# Patient Record
Sex: FEMALE | Race: WHITE | NOT HISPANIC OR LATINO | Employment: OTHER | URBAN - METROPOLITAN AREA
[De-identification: names, ages, dates, MRNs, and addresses within clinical notes are randomized per-mention and may not be internally consistent; named-entity substitution may affect disease eponyms.]

---

## 2019-03-07 ENCOUNTER — HOSPITAL ENCOUNTER (OUTPATIENT)
Dept: GENERAL RADIOLOGY | Facility: HOSPITAL | Age: 74
Discharge: HOME | End: 2019-03-07

## 2020-06-02 ENCOUNTER — HOSPITAL ENCOUNTER (OUTPATIENT)
Dept: GENERAL RADIOLOGY | Facility: HOSPITAL | Age: 75
Discharge: HOME | End: 2020-06-02
Attending: NURSE PRACTITIONER

## 2020-06-08 ENCOUNTER — HOSPITAL ENCOUNTER (OUTPATIENT)
Dept: ULTRASOUND IMAGING | Facility: HOSPITAL | Age: 75
Discharge: HOME | End: 2020-06-08
Attending: NURSE PRACTITIONER

## 2020-07-16 ENCOUNTER — HOSPITAL ENCOUNTER (OUTPATIENT)
Dept: GENERAL RADIOLOGY | Facility: HOSPITAL | Age: 75
Discharge: HOME | End: 2020-07-16
Attending: NURSE PRACTITIONER

## 2020-12-02 ENCOUNTER — HOSPITAL ENCOUNTER (OUTPATIENT)
Dept: GENERAL RADIOLOGY | Facility: HOSPITAL | Age: 75
Discharge: HOME | End: 2020-12-02
Attending: NURSE PRACTITIONER

## 2021-01-27 ENCOUNTER — OFFICE VISIT CONVERTED (OUTPATIENT)
Dept: OTOLARYNGOLOGY | Facility: CLINIC | Age: 76
End: 2021-01-27
Attending: OTOLARYNGOLOGY

## 2021-01-27 ENCOUNTER — CONVERSION ENCOUNTER (OUTPATIENT)
Dept: OTOLARYNGOLOGY | Facility: CLINIC | Age: 76
End: 2021-01-27

## 2021-02-01 ENCOUNTER — HOSPITAL ENCOUNTER (OUTPATIENT)
Dept: OTHER | Facility: HOSPITAL | Age: 76
Discharge: HOME | End: 2021-02-01
Attending: INTERNAL MEDICINE

## 2021-03-24 ENCOUNTER — HOSPITAL ENCOUNTER (OUTPATIENT)
Dept: PREADMISSION TESTING | Facility: HOSPITAL | Age: 76
Discharge: HOME | End: 2021-03-24
Attending: ORTHOPAEDIC SURGERY

## 2021-03-24 ENCOUNTER — CONVERSION ENCOUNTER (OUTPATIENT)
Dept: ORTHOPEDIC SURGERY | Facility: CLINIC | Age: 76
End: 2021-03-24

## 2021-03-24 ENCOUNTER — OFFICE VISIT CONVERTED (OUTPATIENT)
Dept: ORTHOPEDIC SURGERY | Facility: CLINIC | Age: 76
End: 2021-03-24
Attending: ORTHOPAEDIC SURGERY

## 2021-03-24 LAB — SARS-COV-2 RNA SPEC QL NAA+PROBE: NOT DETECTED

## 2021-03-25 ENCOUNTER — HOSPITAL ENCOUNTER (OUTPATIENT)
Dept: PERIOP | Facility: HOSPITAL | Age: 76
Setting detail: HOSPITAL OUTPATIENT SURGERY
Discharge: HOME | End: 2021-03-25
Attending: ORTHOPAEDIC SURGERY

## 2021-03-25 LAB
ANION GAP SERPL CALC-SCNC: 15 MMOL/L (ref 8–19)
BUN SERPL-MCNC: 38 MG/DL (ref 5–25)
BUN/CREAT SERPL: 30 {RATIO} (ref 6–20)
CALCIUM SERPL-MCNC: 9.3 MG/DL (ref 8.7–10.4)
CHLORIDE SERPL-SCNC: 102 MMOL/L (ref 99–111)
CONV CO2: 28 MMOL/L (ref 22–32)
CREAT UR-MCNC: 1.26 MG/DL (ref 0.5–0.9)
GFR SERPLBLD BASED ON 1.73 SQ M-ARVRAT: 41 ML/MIN/{1.73_M2}
GLUCOSE BLD-MCNC: 108 MG/DL (ref 65–99)
GLUCOSE SERPL-MCNC: 99 MG/DL (ref 65–99)
OSMOLALITY SERPL CALC.SUM OF ELEC: 301 MOSM/KG (ref 273–304)
POTASSIUM SERPL-SCNC: 3.6 MMOL/L (ref 3.5–5.3)
SODIUM SERPL-SCNC: 141 MMOL/L (ref 135–147)

## 2021-04-07 ENCOUNTER — CONVERSION ENCOUNTER (OUTPATIENT)
Dept: ORTHOPEDIC SURGERY | Facility: CLINIC | Age: 76
End: 2021-04-07

## 2021-04-07 ENCOUNTER — OFFICE VISIT CONVERTED (OUTPATIENT)
Dept: ORTHOPEDIC SURGERY | Facility: CLINIC | Age: 76
End: 2021-04-07

## 2021-05-10 ENCOUNTER — OFFICE VISIT CONVERTED (OUTPATIENT)
Dept: ORTHOPEDIC SURGERY | Facility: CLINIC | Age: 76
End: 2021-05-10
Attending: PHYSICIAN ASSISTANT

## 2021-05-10 NOTE — H&P
History and Physical      Patient Name: Gemma Benjamin   Patient ID: [id number]   Sex: Female   YOB: 1945    Primary Care Provider: Alice LINDER   Referring Provider: Alice LINDER    Visit Date: March 24, 2021    Provider: Don Jolly MD   Location: [facility]   Location Address: [address] [place] [address]  [id number]   Location Phone: [phone]          Chief Complaint  · Left Wrist Injury      History Of Present Illness  Gemma Benjamin is a 75 year old /White female who presents today to [facility].      Patient presents today for an evaluation of a left wrist injury. Patient had injured her left wrist on 3/21/21. She went to [facility] ED and obtained x-rays revealed a left wrist fracture. She had a closed reduction performed at the ED and was placed into a long arm splint. She was referred to [facility] for further evaluation. She is present today in the long arm splint. She states she does have a significant amount of pain today.       Past Medical History  GERD (gastroesophageal reflux disease); Hyperlipidemia; Hypertension; Sleep apnea; Thyroid nodules (nontoxic multinodular goiter); Type 2 diabetes mellitus         Past Surgical History  Lumpectomy of left breast         Medication List  escitalopram oxalate 20 mg oral tablet; indapamide 2.5 mg oral tablet; losartan 100 mg oral tablet; Novolog Flexpen U-100 Insulin 100 unit/mL (3 mL) subcutaneous insulin pen; rosuvastatin 10 mg oral tablet; Vitamin D3 50 mcg (2,000 unit) oral tablet         Allergy List  SULFA (SULFONAMIDES)       Allergies Reconciled  Family Medical History  Family history of lung cancer; Family history of pancreatic cancer; Family history of stroke; Family history of heart disease; Family history of diabetes mellitus         Social History  Tobacco (Never)         Review of Systems  · Constitutional  o Denies  o : fever, chills, weight loss  · Cardiovascular  o Denies  o :  "chest pain, shortness of breath  · Gastrointestinal  o Denies  o : liver disease, heartburn, nausea, blood in stools  · Genitourinary  o Denies  o : painful urination, blood in urine  · Integument  o Denies  o : rash, itching  · Neurologic  o Denies  o : headache, weakness, loss of consciousness  · Musculoskeletal  o Denies  o : painful, swollen joints  · Psychiatric  o Denies  o : drug/alcohol addiction, anxiety, depression      Vitals  Date Time BP Position Site L\R Cuff Size HR RR TEMP (F) WT  HT  BMI kg/m2 BSA m2 O2 Sat FR L/min FiO2 HC       03/24/2021 08:03 AM      78 - R   221lbs 8oz 5'  2\" 40.51 2.1 96 %            Physical Examination  · Constitutional  o Appearance  o : well developed, well-nourished, no obvious deformities present  · Head and Face  o Head  o :   § Inspection  § : normocephalic  o Face  o :   § Inspection  § : no facial lesions  · Eyes  o Conjunctivae  o : conjunctivae normal  o Sclerae  o : sclerae white  · Ears, Nose, Mouth and Throat  o Ears  o :   § External Ears  § : appearance within normal limits  § Hearing  § : intact  o Nose  o :   § External Nose  § : appearance normal  · Neck  o Inspection/Palpation  o : normal appearance  o Range of Motion  o : full range of motion  · Respiratory  o Respiratory Effort  o : breathing unlabored  o Inspection of Chest  o : normal appearance  o Auscultation of Lungs  o : no audible wheezing or rales  · Cardiovascular  o Heart  o : regular rate  · Gastrointestinal  o Abdominal Examination  o : soft and non-tender  · Skin and Subcutaneous Tissue  o General Inspection  o : intact, no rashes  · Psychiatric  o General  o : Alert and oriented x3  o Judgement and Insight  o : judgment and insight intact  o Mood and Affect  o : mood normal, affect appropriate  · Left Wrist  o Inspection  o : Sensation grossly intact. Neurovascular intact. Skin intact. Patient able to wiggle fingers and make a fist. Swelling. Radial pulse 2+, ulnar pulse 2+. Angulation of " the wrist.   · Imaging  o Imaging  o : 3/21/21 X-RAY LEFT WRIST: There has been interval closed reduction of the distal left radial fracture with slight improvement of alignment.           Assessment  · Left distal radius fracture     814.01  · Left wrist pain     719.43/M25.532      Plan  · Medications  o Medications have been Reconciled  o Transition of Care or Provider Policy  · Instructions  o Dr. Jolly saw and examined the patient and agrees with plan.   o X-rays reviewed by Dr. Jolly.  o Reviewed the patient's Past Medical, Social, and Family history as well as the ROS at today's visit, no changes.  o Call or return if worsening symptoms.  o Discussed surgery.  o Risks/benefits discussed with patient including, but not limited to: infection, bleeding, neurovascular damage, malunion, nonunion, aesthetic deformity, need for further surgery, and death.  o Surgery pamphlet given.  o Follow Up Post-Op.  o The above service was scribed by Zenaida Martin on my behalf and I attest to the accuracy of the note. taryn  o Discussed treatment plans and diagnosis with the patient. Discussed operative vs non-operative measures. Patient wishes to proceed with reduction and fixation of left distal radius fracture.   o Electronically Identified Patient Education Materials Provided Electronically            Electronically Signed by: Zenaida Martin-, Other -Author on March 24, 2021 08:59:35 AM  Electronically Co-signed by: Don Jolly MD -Reviewer on March 24, 2021 09:21:50 PM

## 2021-05-10 NOTE — H&P
"   History and Physical      Patient Name: Gemma Benjamin   Patient ID: [id number]   Sex: Female   YOB: 1945    Primary Care Provider: Alice LINDER   Referring Provider: Alice LINDER    Visit Date: January 27, 2021    Provider: Wali Fowler MD   Location: [facility]   Location Address: [address] [place] [address], [address] [place] [address]  [id number]   Location Phone: [phone]          Chief Complaint     \"I am here about my thyroid.\"       History Of Present Illness  Gemma Benjamin is a 75 year old /White female with past medical history significant for hypertension who presents to the office today as a consult from Alice BRUNO for evaluation of thyroid nodules. She tells me that these were initially discovered on work-up of shortness of breath with exertion. She has seen Dr. Seay who is an endocrine surgeon in [place]. She denies any issues with dysphagia, hoarseness, or stridor. She cannot recall any family history of thyroid cancer but does report a personal history of radiation exposure while undergoing radiation therapy for breast cancer. She does not smoke. Her initial thyroid ultrasound on 6/2/2020 revealed a right 1.6 cm hypoechoic nodule, a right 2.6 cm isoechoic nodule, a right 0.9 cm hypoechoic nodule, and 3 left-sided nodules with the largest measuring 1.9 cm. Subsequent ultrasound-guided FNA of the 2 largest right-sided nodules on 6/8/2020 was found to be consistent with colloid nodules ([place] category 2). Her most recent thyroid ultrasound on 12/2/2020 revealed an increase in the hypoechoic right 1.6 cm nodule to 2.1 cm.       Past Medical History  GERD (gastroesophageal reflux disease); Hyperlipidemia; Hypertension; Sleep apnea; Thyroid nodules (nontoxic multinodular goiter); Type 2 diabetes mellitus         Past Surgical History  Lumpectomy of left breast         Medication List  escitalopram oxalate 20 mg oral tablet; indapamide 2.5 mg " "oral tablet; losartan 100 mg oral tablet; Novolog Flexpen U-100 Insulin 100 unit/mL (3 mL) subcutaneous insulin pen; rosuvastatin 10 mg oral tablet; Vitamin D3 50 mcg (2,000 unit) oral tablet         Allergy List  SULFA (SULFONAMIDES)         Family Medical History  Family history of lung cancer; Family history of pancreatic cancer; Family history of stroke; Family history of heart disease; Family history of diabetes mellitus         Social History  Tobacco (Never)         Review of Systems  · Constitutional  o Denies  o : fever, night sweats, weight loss  · Eyes  o Denies  o : discharge from eye, impaired vision  · HENT  o Admits  o : *See HPI  · Cardiovascular  o Denies  o : chest pain, irregular heart beats  · Respiratory  o Denies  o : shortness of breath, wheezing, coughing up blood  · Gastrointestinal  o Admits  o : reflux  o Denies  o : heartburn, vomiting blood  · Genitourinary  o Denies  o : frequency  · Integument  o Denies  o : rash, skin dryness  · Neurologic  o Denies  o : seizures, loss of balance, loss of consciousness, dizziness  · Endocrine  o Denies  o : cold intolerance, heat intolerance  · Heme-Lymph  o Denies  o : easy bleeding, anemia      Vitals  Date Time BP Position Site L\R Cuff Size HR RR TEMP (F) WT  HT  BMI kg/m2 BSA m2 O2 Sat FR L/min FiO2 HC       01/27/2021 11:06 AM        96 220lbs 0oz 5'  2\" 40.24 2.09             Physical Examination  · Constitutional  o Appearance  o : well developed, well-nourished, alert and in no acute distress, voice clear and strong  · Head and Face  o Head  o :   § Inspection  § : no deformities or lesions  o Face  o :   § Inspection  § : No facial lesions; House-Brackmann I/VI bilaterally  § Palpation  § : No TMJ crepitus nor  muscle tenderness bilaterally  · Eyes  o Vision  o :   § Visual Fields  § : Extraocular movements are intact. No spontaneous or gaze-induced nystagmus.  o Conjunctivae  o : clear  o Sclerae  o : clear  o Pupils and " Irises  o : pupils equal, round, and reactive to light.   · Ears, Nose, Mouth and Throat  o Ears  o :   § External Ears  § : appearance within normal limits, no lesions present  § Otoscopic Examination  § : tympanic membrane appearance within normal limits bilaterally without perforations, well-aerated middle ears  § Hearing  § : intact to conversational voice both ears  o Nose  o :   § External Nose  § : appearance normal  § Intranasal Exam  § : mucosa within normal limits, vestibules normal, no intranasal lesions present, septum midline, sinuses non tender to percussion  o Oral Cavity  o :   § Oral Mucosa  § : oral mucosa normal without pallor or cyanosis  § Lips  § : lip appearance normal  § Teeth  § : Partial dentition for age  § Gums  § : gums pink, non-swollen, no bleeding present  § Tongue  § : tongue appearance normal; normal mobility  § Palate  § : hard palate normal, soft palate appearance normal with symmetric mobility  o Throat  o :   § Oropharynx  § : no inflammation or lesions present, tonsils within normal limits  § Hypopharynx  § : Deferred secondary to gag reflex  § Larynx  § : Deferred secondary to gag reflex  · Neck  o Inspection/Palpation  o : normal appearance, no masses or tenderness, trachea midline; thyroid size normal, nontender, no obviously palpable nodules  · Respiratory  o Respiratory Effort  o : breathing unlabored  o Inspection of Chest  o : normal appearance, no retractions  · Cardiovascular  o Heart  o : regular rate and rhythm  · Lymphatic  o Neck  o : no lymphadenopathy present  o Supraclavicular Nodes  o : no lymphadenopathy present  o Preauricular Nodes  o : no lymphadenopathy present  · Skin and Subcutaneous Tissue  o General Inspection  o : Regarding face and neck - there are no rashes present, no lesions present, and no areas of discoloration  · Neurologic  o Cranial Nerves  o : cranial nerves II-XII are grossly intact bilaterally  o Gait and Station  o : normal gait, able to  stand without diffculty  · Psychiatric  o Judgement and Insight  o : judgment and insight intact  o Mood and Affect  o : mood normal, affect appropriate          Assessment  · Thyroid nodule     241.0/E04.1      Plan  · Orders  o Ultrasound Thyroid. (71657) - 241.0/E04.1 - 12/28/2021  · Medications  o Medications have been Reconciled  o Transition of Care or Provider Policy  · Instructions  o Impressions and findings were discussed Mrs. Benjamin at great length. Currently, she is seen for evaluation of thyroid nodules. Fortunately, she does not have any significant risk factors for thyroid cancer and a previous ultrasound-guided FNA of her 2 largest right-sided nodules was found to be consistent with colloid nodules ([place] category 2). We discussed the pathophysiology and natural history of these benign nodules. One of the previously biopsied nodules was found to have increased from 1.6 cm on 6/2/2020 to 2.1 cm on 12/2/2020. Even with this change in size I would recommend continued observation given her previous FNA results and lack of symptoms. If this increases significantly we will discuss repeat sampling versus thyroid lobectomy. In the meantime, we will proceed with a repeat ultrasound in 1 year or sooner if needed.  · Correspondence  o ENT Letter to Referring MD (Alice LINDER) - 01/28/2021            Electronically Signed by: Wali Fowler MD -Author on January 28, 2021 08:46:37 AM

## 2021-05-14 VITALS — HEART RATE: 76 BPM | WEIGHT: 220 LBS | HEIGHT: 62 IN | BODY MASS INDEX: 40.48 KG/M2 | OXYGEN SATURATION: 98 %

## 2021-05-14 VITALS — BODY MASS INDEX: 40.76 KG/M2 | WEIGHT: 221.5 LBS | HEIGHT: 62 IN | OXYGEN SATURATION: 96 % | HEART RATE: 78 BPM

## 2021-05-14 VITALS — TEMPERATURE: 96 F | BODY MASS INDEX: 40.48 KG/M2 | WEIGHT: 220 LBS | HEIGHT: 62 IN

## 2021-05-14 NOTE — PROGRESS NOTES
Progress Note      Patient Name: Gemma Benjamin   Patient ID: [id number]   Sex: Female   YOB: 1945    Primary Care Provider: Alice LINDER   Referring Provider: Alice LINDER    Visit Date: April 7, 2021    Provider: Baltazar Moses PA-C   Location: [facility]   Location Address: [address] [place] [address]  [id number]   Location Phone: [phone]          Chief Complaint  · Left Distal Radius Fracture      History Of Present Illness  Gemma Benjamin is a 75 year old /White female who presents today to [facility].      Patient presents today with a follow-up of left wrist pain. She is s/p open reduction and fixation of a 3-part intraarticular distal radius fracture performed on 3/25/21 by Dr. Jolly. She sustained distal radius fracture on 3/21/21. Patient reports she has been adherent to all the interventions put in during the time of surgery.       Past Medical History  Arthritis; Asthma; Cancer; Diabetes; GERD (gastroesophageal reflux disease); Hyperlipemia; Hyperlipidemia; Hypertension; Reflux; Seasonal allergies; Sleep apnea; Thyroid disorder; Thyroid nodules (nontoxic multinodular goiter); Type 2 diabetes mellitus         Past Surgical History  Lumpectomy of left breast         Medication List  escitalopram oxalate 20 mg oral tablet; indapamide 2.5 mg oral tablet; losartan 100 mg oral tablet; Novolog Flexpen U-100 Insulin 100 unit/mL (3 mL) subcutaneous insulin pen; rosuvastatin 10 mg oral tablet; Vitamin D3 50 mcg (2,000 unit) oral tablet         Allergy List  SULFA (SULFONAMIDES)       Allergies Reconciled  Family Medical History  Stroke; Heart Disease; Cancer, Unspecified; Diabetes, unspecified type; Family history of lung cancer; Osteoporosis; Family history of pancreatic cancer; Family history of Arthritis; Family history of stroke; Family history of heart disease; Family history of diabetes mellitus         Social History  Alcohol Use  "(Current some day); lives with children; lives with spouse; .; Recreational Drug Use (Never); Retired.; Tobacco (Never)         Review of Systems  · Constitutional  o Denies  o : fever, chills, weight loss  · Cardiovascular  o Denies  o : chest pain, shortness of breath  · Gastrointestinal  o Denies  o : liver disease, heartburn, nausea, blood in stools  · Genitourinary  o Denies  o : painful urination, blood in urine  · Integument  o Denies  o : rash, itching  · Neurologic  o Denies  o : headache, weakness, loss of consciousness  · Musculoskeletal  o Denies  o : painful, swollen joints  · Psychiatric  o Denies  o : drug/alcohol addiction, anxiety, depression      Vitals  Date Time BP Position Site L\R Cuff Size HR RR TEMP (F) WT  HT  BMI kg/m2 BSA m2 O2 Sat FR L/min FiO2 HC       04/07/2021 01:08 PM      75 - R   220lbs 0oz 5'  2\" 40.24 2.09 98 %            Physical Examination  · Constitutional  o Appearance  o : well developed, well-nourished, no obvious deformities present  · Head and Face  o Head  o :   § Inspection  § : normocephalic  o Face  o :   § Inspection  § : no facial lesions  · Eyes  o Conjunctivae  o : conjunctivae normal  o Sclerae  o : sclerae white  · Ears, Nose, Mouth and Throat  o Ears  o :   § External Ears  § : appearance within normal limits  § Hearing  § : intact  o Nose  o :   § External Nose  § : appearance normal  · Neck  o Inspection/Palpation  o : normal appearance  o Range of Motion  o : full range of motion  · Respiratory  o Respiratory Effort  o : breathing unlabored  o Inspection of Chest  o : normal appearance  o Auscultation of Lungs  o : no audible wheezing or rales  · Cardiovascular  o Heart  o : regular rate  · Gastrointestinal  o Abdominal Examination  o : soft and non-tender  · Skin and Subcutaneous Tissue  o General Inspection  o : intact, no rashes  · Psychiatric  o General  o : Alert and oriented x3  o Judgement and Insight  o : judgment and insight intact  o Mood " and Affect  o : mood normal, affect appropriate  · Left Wrist  o Inspection  o : Pain limited as expected. Surgical sites appreciated and are clean, dry intact and healing appreciated. Neruovascularly intact.   · In Office Procedures  o View  o : AP/LATERAL  o Site  o : left, wrist   o Indication  o : Left wrist fracture  o Study  o : X-rays ordered, taken in the office, and reviewed today.  o Xray  o : Good anatomic alignment of the fracture.           Assessment  · Aftercare following left open reduction and fixation of a 3-part intraarticular distal radius fracture     V54.81  · Left wrist pain     719.43/M25.532      Plan  · Orders  o Wrist (Left) 2 views X-Ray [facility] ([id number]) - 719.43/M25.532 - 04/07/2021  · Medications  o Medications have been Reconciled  o Transition of Care or Provider Policy  · Instructions  o Reviewed the patient's Past Medical, Social, and Family history as well as the ROS at today's visit, no changes.  o Call or return if worsening symptoms.  o Follow Up in 4 weeks.   o This note was transcribed by Zenaida Martin. gail/taryn  o Patient is placed into a comfort form brace today and is directed to wear this brace at all times except for doing gentle range of motion as demonstrated in clinic today and personal hygiene. Patient is limited to no more than 8 ounces of weight holding and lifting with the left hand. Patient is to follow-up in 4 weeks and x-rays will be obtained at that time. 2 views of the left wrist.             Electronically Signed by: Zenaida Martin-, Other -Author on April 9, 2021 11:37:31 AM  Electronically Co-signed by: Don Jolly MD -Reviewer on April 9, 2021 09:24:12 PM  Electronically Co-signed by: Baltazar Moses PA-C -Reviewer on April 12, 2021 12:56:47 PM

## 2021-05-22 ENCOUNTER — TRANSCRIBE ORDERS (OUTPATIENT)
Dept: ADMINISTRATIVE | Facility: HOSPITAL | Age: 76
End: 2021-05-22

## 2021-05-22 DIAGNOSIS — E04.2 MULTIPLE THYROID NODULES: Primary | ICD-10-CM

## 2021-06-06 NOTE — PROGRESS NOTES
Progress Note      Patient Name: Gemma Benjamin   Patient ID: [id number]   Sex: Female   YOB: 1945    Primary Care Provider: Alice LINDER   Referring Provider: Alice LINDER    Visit Date: May 10, 2021    Provider: Baltazar Moses PA-C   Location: [facility]   Location Address: [address] [place] [address]  [id number]   Location Phone: [phone]          Chief Complaint  · left wrist pain      History Of Present Illness  Gemma Benjamin is a 75 year old /White female who presents today to [facility].      Patient presents today for a follow-up of left wrist. She is s/p open reduction and fixation of a 3-part intraarticular distal radius fracture performed on 3/25/21 by Dr. Jolly. Patient sustained distal radius fracture on 3/21/21. She has been adherent since all interventions placed since the last visit with us.            Past Medical History  Arthritis; Asthma; Cancer; Diabetes; GERD (gastroesophageal reflux disease); Hyperlipemia; Hyperlipidemia; Hypertension; Reflux; Seasonal allergies; Sleep apnea; Thyroid disorder; Thyroid nodules (nontoxic multinodular goiter); Type 2 diabetes mellitus         Past Surgical History  Lumpectomy of left breast         Medication List  escitalopram oxalate 20 mg oral tablet; indapamide 2.5 mg oral tablet; losartan 100 mg oral tablet; Novolog Flexpen U-100 Insulin 100 unit/mL (3 mL) subcutaneous insulin pen; rosuvastatin 10 mg oral tablet; Vitamin D3 50 mcg (2,000 unit) oral tablet         Allergy List  SULFA (SULFONAMIDES)       Allergies Reconciled  Family Medical History  Stroke; Heart Disease; Cancer, Unspecified; Diabetes, unspecified type; Family history of lung cancer; Osteoporosis; Family history of pancreatic cancer; Family history of Arthritis; Family history of stroke; Family history of heart disease; Family history of diabetes mellitus         Social History  Alcohol Use (Current some day); lives with  "children; lives with spouse; .; Recreational Drug Use (Never); Retired.; Tobacco (Never)         Review of Systems  · Constitutional  o Denies  o : fever, chills, weight loss  · Cardiovascular  o Denies  o : chest pain, shortness of breath  · Gastrointestinal  o Denies  o : liver disease, heartburn, nausea, blood in stools  · Genitourinary  o Denies  o : painful urination, blood in urine  · Integument  o Denies  o : rash, itching  · Neurologic  o Denies  o : headache, weakness, loss of consciousness  · Musculoskeletal  o Denies  o : painful, swollen joints  · Psychiatric  o Denies  o : drug/alcohol addiction, anxiety, depression      Vitals  Date Time BP Position Site L\R Cuff Size HR RR TEMP (F) WT  HT  BMI kg/m2 BSA m2 O2 Sat FR L/min FiO2 HC       05/10/2021 02:08 PM      84 - R   219lbs 2oz 5'  3\" 38.82 2.1 91 %            Physical Examination  · Constitutional  o Appearance  o : well developed, well-nourished, no obvious deformities present  · Head and Face  o Head  o :   § Inspection  § : normocephalic  o Face  o :   § Inspection  § : no facial lesions  · Eyes  o Conjunctivae  o : conjunctivae normal  o Sclerae  o : sclerae white  · Ears, Nose, Mouth and Throat  o Ears  o :   § External Ears  § : appearance within normal limits  § Hearing  § : intact  o Nose  o :   § External Nose  § : appearance normal  · Neck  o Inspection/Palpation  o : normal appearance  o Range of Motion  o : full range of motion  · Respiratory  o Respiratory Effort  o : breathing unlabored  o Inspection of Chest  o : normal appearance  o Auscultation of Lungs  o : no audible wheezing or rales  · Cardiovascular  o Heart  o : regular rate  · Gastrointestinal  o Abdominal Examination  o : soft and non-tender  · Skin and Subcutaneous Tissue  o General Inspection  o : intact, no rashes  · Psychiatric  o General  o : Alert and oriented x3  o Judgement and Insight  o : judgment and insight intact  o Mood and Affect  o : mood normal, " affect appropriate  · Left Wrist  o Inspection  o : Surgical site appreciated to be healing well with good scar formation and supple. 5 degrees of palmar flexion. Neurovascular intact.   · In Office Procedures  o View  o : AP/LATERAL  o Site  o : left, wrist  o Indication  o : left wrist pain  o Study  o : X-rays ordered, taken in the office, and reviewed today.  o Xray  o : 3-part left distal radius fracture with no appreciable changes of alignment or positions compared to previous x-rays with routine healing. Retained hardware of ORIF demonstrates no signs of loosening.  o Comparative Data  o : Comparative Data found and reviewed today              Assessment  · Aftercare following open reduction and fixation of a 3-part intraarticular distal radius fracture     V54.81  · Pain: Wrist     719.43/M25.539      Plan  · Orders  o Wrist (Left) 2 views X-Ray [facility] ([id number]) - 719.43/M25.539 - 05/10/2021  · Medications  o Medications have been Reconciled  o Transition of Care or Provider Policy  · Instructions  o Reviewed the patient's Past Medical, Social, and Family history as well as the ROS at today's visit, no changes.  o Call or return if worsening symptoms.  o X-ray ordered, taken and reviewed at this visit.  o Reviewed Xrays.  o Follow Up in 4 weeks.   o This note was transcribed by Zenaida Martin. gail/taryn  o Patient is referred to physical therapy/hand therapy at this time. She is to follow-up in 4 weeks. She is limited to no more than 2lbs of weight lifting of the left hand. She will get repeat X-rays at the next visit, 2 views of left wrist. She will continue the use of the left wrist brace. She states her understanding and agreement with this plan.             Electronically Signed by: Zenaida Martin-, Other -Author on May 11, 2021 08:56:16 AM  Electronically Co-signed by: Baltazar Moses PA-C -Reviewer on May 11, 2021 12:05:47 PM  Electronically Co-signed by: Don Jolly MD -Reviewer on  May 14, 2021 09:29:02 PM

## 2021-06-21 ENCOUNTER — OFFICE VISIT (OUTPATIENT)
Dept: ORTHOPEDIC SURGERY | Facility: CLINIC | Age: 76
End: 2021-06-21

## 2021-06-21 VITALS — OXYGEN SATURATION: 94 % | HEIGHT: 62 IN | BODY MASS INDEX: 36.8 KG/M2 | HEART RATE: 73 BPM | WEIGHT: 200 LBS

## 2021-06-21 DIAGNOSIS — Z47.89 AFTERCARE FOLLOWING SURGERY OF THE MUSCULOSKELETAL SYSTEM: ICD-10-CM

## 2021-06-21 DIAGNOSIS — M25.532 LEFT WRIST PAIN: Primary | ICD-10-CM

## 2021-06-21 PROCEDURE — 99024 POSTOP FOLLOW-UP VISIT: CPT | Performed by: PHYSICIAN ASSISTANT

## 2021-06-21 RX ORDER — BUSPIRONE HYDROCHLORIDE 10 MG/1
10 TABLET ORAL
COMMUNITY
Start: 2021-05-20 | End: 2022-12-21 | Stop reason: SDUPTHER

## 2021-06-21 RX ORDER — INSULIN PUMP CONTROLLER
EACH MISCELLANEOUS
COMMUNITY
Start: 2021-05-19 | End: 2022-12-21

## 2021-06-21 RX ORDER — INSULIN PUMP CONTROLLER
EACH MISCELLANEOUS
COMMUNITY
Start: 2021-01-21 | End: 2021-12-09 | Stop reason: SDUPTHER

## 2021-06-21 RX ORDER — INDAPAMIDE 2.5 MG/1
TABLET, FILM COATED ORAL
COMMUNITY
Start: 2021-05-21

## 2021-06-21 RX ORDER — ESCITALOPRAM OXALATE 20 MG/1
TABLET ORAL
COMMUNITY
Start: 2021-05-22 | End: 2022-12-21

## 2021-06-21 RX ORDER — PEN NEEDLE, DIABETIC 32 GX 1/4"
NEEDLE, DISPOSABLE MISCELLANEOUS
COMMUNITY
Start: 2021-04-22

## 2021-06-21 RX ORDER — ROSUVASTATIN CALCIUM 20 MG/1
20 TABLET, COATED ORAL DAILY
COMMUNITY
Start: 2021-05-26 | End: 2021-12-09 | Stop reason: SDUPTHER

## 2021-06-21 RX ORDER — LOSARTAN POTASSIUM 100 MG/1
TABLET ORAL
COMMUNITY
Start: 2021-05-21 | End: 2022-12-21 | Stop reason: SDUPTHER

## 2021-06-21 RX ORDER — TRAMADOL HYDROCHLORIDE 50 MG/1
50 TABLET ORAL EVERY 8 HOURS PRN
COMMUNITY
Start: 2021-04-16 | End: 2022-12-21

## 2021-06-21 NOTE — PATIENT INSTRUCTIONS
Begin physical therapy.  Wear brace for comfort.  Tylenol and ibuprofen for pain.  Rest, ice, elevate wrist.  Return in 4 to 6 weeks for recheck.

## 2021-06-21 NOTE — ASSESSMENT & PLAN NOTE
Patient to start physical therapy at this time.  Recommend wrist brace as well as Tylenol and ibuprofen for pain.  Patient is to follow-up in 4 to 6 weeks time for recheck on range of motion.  X-ray at next visit

## 2021-06-21 NOTE — PROGRESS NOTES
"Chief Complaint  Pain of the Left Wrist    Subjective          Gemma Benjamin presents to [facility] for follow-up on 3 part intra-articular left distal radius fracture with ORIF performed by Dr. Jolly 3/25/2021.  Patient states she is doing well, has stiffness in the wrist.  Wears brace occasionally.  Has some pain with range of motion.  Has not done any therapy up to this point.    Objective   Vital Signs:   Pulse 73   Ht 157.5 cm (62\")   Wt 90.7 kg (200 lb)   SpO2 94%   BMI 36.58 kg/m²       Physical Exam  Constitutional:       Appearance: Normal appearance. He is well-developed and normal weight.   HENT:      Head: Normocephalic.      Right Ear: Hearing and external ear normal.      Left Ear: Hearing and external ear normal.      Nose: Nose normal.   Eyes:      Conjunctiva/sclera: Conjunctivae normal.   Cardiovascular:      Rate and Rhythm: Normal rate.   Pulmonary:      Effort: Pulmonary effort is normal.      Breath sounds: No wheezing or rales.   Abdominal:      Palpations: Abdomen is soft.      Tenderness: There is no abdominal tenderness.   Musculoskeletal:      Cervical back: Normal range of motion.   Skin:     Findings: No rash.   Neurological:      Mental Status: He is alert and oriented to person, place, and time.   Psychiatric:         Mood and Affect: Mood and affect normal.         Judgment: Judgment normal.     Ortho Exam  Left wrist: Well-healed surgical incision, mild soft tissue swelling, limited extension, limited flexion although range of motion feels smooth during exam, no clicking or crepitus.  Full supination and pronation.  Full range of motion of all digits on left upper extremity,  strength equal and within normal limits bilaterally.  Radial pulses 2+ bilaterally cap refill less than 2 seconds bilateral upper extremity digits.  Neurovascularly intact.  Result Review :            Imaging Results (Most Recent)     Procedure Component Value Units Date/Time "    XR Wrist 2 View Left [[id number]] Resulted: 06/21/21 1135     Updated: 06/21/21 1136    Narrative:      X-Ray Report:  Study: X-rays ordered, taken in the office, and reviewed today  Site: Left wrist xray  Indication: Pain, fracture follow-up  View: AP and Lateral view(s)  Findings: Well-healing fracture of the distal radius, hardware intact,   mild soft tissue swelling.  No acute osseous abnormalities or malalignment   from prior.   Been reviewed this x-ray.  Prior studies available for comparison: yes                   Assessment and Plan    Problem List Items Addressed This Visit        Musculoskeletal and Injuries    Left wrist pain - Primary    Relevant Orders    XR Wrist 2 View Left (Completed)    Ambulatory Referral to Physical Therapy Evaluate and treat, POST OP; Stretching, ROM, Strengthening    Aftercare following surgery of the musculoskeletal system    Current Assessment & Plan     Patient to start physical therapy at this time.  Recommend wrist brace as well as Tylenol and ibuprofen for pain.  Patient is to follow-up in 4 to 6 weeks time for recheck on range of motion.  X-ray at next visit               Follow Up   Return in about 4 weeks (around 7/19/2021).  Patient Instructions   Begin physical therapy.  Wear brace for comfort.  Tylenol and ibuprofen for pain.  Rest, ice, elevate wrist.  Return in 4 to 6 weeks for recheck.      Patient was given instructions and counseling regarding her condition or for health maintenance advice. Please see specific information pulled into the AVS if appropriate.

## 2021-06-28 DIAGNOSIS — S52.502D CLOSED FRACTURE OF DISTAL END OF LEFT RADIUS WITH ROUTINE HEALING, UNSPECIFIED FRACTURE MORPHOLOGY, SUBSEQUENT ENCOUNTER: Primary | ICD-10-CM

## 2021-06-29 ENCOUNTER — TREATMENT (OUTPATIENT)
Dept: PHYSICAL THERAPY | Facility: CLINIC | Age: 76
End: 2021-06-29

## 2021-06-29 DIAGNOSIS — M25.532 LEFT WRIST PAIN: ICD-10-CM

## 2021-06-29 DIAGNOSIS — S52.502D CLOSED FRACTURE OF DISTAL END OF LEFT RADIUS WITH ROUTINE HEALING, UNSPECIFIED FRACTURE MORPHOLOGY, SUBSEQUENT ENCOUNTER: Primary | ICD-10-CM

## 2021-06-29 PROCEDURE — 97022 WHIRLPOOL THERAPY: CPT | Performed by: PHYSICAL THERAPIST

## 2021-06-29 PROCEDURE — 97165 OT EVAL LOW COMPLEX 30 MIN: CPT | Performed by: PHYSICAL THERAPIST

## 2021-06-29 PROCEDURE — 97110 THERAPEUTIC EXERCISES: CPT | Performed by: PHYSICAL THERAPIST

## 2021-06-29 NOTE — PROGRESS NOTES
Outpatient Occupational Therapy Ortho Initial Evaluation    Patient: Gemma Benjamin   : 1945  Diagnosis/ICD-10 Code:  Closed fracture of distal end of left radius with routine healing, unspecified fracture morphology, subsequent encounter [S52.502D]  Referring practitioner: JESUS ALBERTO Trejo  Date of Initial Visit: 2021  Today's Date: 2021  Patient seen for 1 sessions               Subjective Questionnaire: QuickDASH:       Subjective Evaluation    History of Present Illness  Date of onset: 3/23/2021  Date of surgery: 3/24/2021  Mechanism of injury: Pt reports falling down stairs landing on her L wrist.      Patient Occupation: retired   Precautions and Work Restrictions: 2 lb limitQuality of life: good    Pain  Current pain ratin  At best pain ratin  At worst pain ratin  Quality: throbbing and dull ache  Relieving factors: medications  Aggravating factors: repetitive movement    Social Support  Lives with: adult children and spouse    Hand dominance: right    Patient Goals  Patient goals for therapy: decreased pain, increased strength and increased motion  Patient goal: Get as much use back with least amount of pain.           Objective          Observations     Left Wrist/Hand   Positive for edema and incision.       Neurological Testing     Sensation     Wrist/Hand   Left   Intact: light touch    Active Range of Motion     Left Wrist   Wrist flexion: 45 degrees   Wrist extension: 40 degrees   Radial deviation: 10 degrees   Ulnar deviation: 20 degrees     Additional Active Range of Motion Details  Full loose composite fist    Strength/Myotome Testing     Left Wrist/Hand      (2nd hand position)     Trial 1: 25 lbs    Trial 2: 25 lbs    Trial 3: 25 lbs    Average: 25 lbs    Right Wrist/Hand      (2nd hand position)     Trial 1: 38 lbs    Trial 2: 40 lbs    Trial 3: 45 lbs    Average: 41 lbs     General Comments     Wrist/Hand Comments  CTR 20 years ago, Breast Cancer with  lymphadema         Assessment & Plan     Assessment  Impairments: abnormal or restricted ROM, activity intolerance, impaired physical strength and pain with function  Assessment details: Painful to knit and do crafts  Prognosis: good  Functional Limitations: carrying objects, lifting, pulling, pushing and unable to perform repetitive tasks  Goals  Plan Goals: 1. The patient complains of pain in the L wrist.                  LTG 1: 12 weeks:  The patient will report a pain rating of 2/10 or better in order to improve sleep quality and tolerance to performance of activities of daily living.                                  STATUS:  New                  STG 1a: 6weeks:  The patient will report a pain rating of 5/10 or better at worst.                                   STATUS:  New  2. The patient has limited ROM of the L wrist                  LTG 2: 12 weeks:  The patient will demonstrate 55 degrees of wrist flex/ext to allow the patient to grasp for lifting and opening.                                  STATUS:  New                   STG 2a: 6 weeks:  The patient will demonstrate 50 degrees of wrist flex/ext.                                  STATUS:  New                             3. The patient has limited strength of the L hand.                  LTG 3: 12 weeks:  The patient will demonstrate 35 lbs L  strength in order to open jars.                                  STATUS:  New                  STG 3a: 6 weeks:  The patient will demonstrate 30 lbs L  strength.                                  STATUS:  New  4. Carrying, Moving, and Handling Objects Functional Limitation                                   LTG 4: 12 weeks:  The patient will demonstrate 1-19% limitation by achieving a score of 19/55 on the Quick DASH.                                  STATUS:  New                                    TREATMENT: Orthotic fabrication/fitting/management and training, Manual therapy, therapeutic exercise, home exercise  instruction, and modalities as needed to include: electrical stimulation, ultrasound, moist heat, paraffin, fluidotherapy and ice.      Plan  Planned modality interventions: TENS and thermotherapy (hydrocollator packs)  Other planned modality interventions: Fluidotherapy  Planned therapy interventions: manual therapy, neuromuscular re-education, strengthening, stretching, home exercise program, functional ROM exercises and fine motor coordination training  Frequency: 2x week  Duration in weeks: 12  Treatment plan discussed with: patient          ICD-10-CM ICD-9-CM   1. Closed fracture of distal end of left radius with routine healing, unspecified fracture morphology, subsequent encounter  S52.502D V54.12   2. Left wrist pain  M25.532 719.43       Patient is indicated for skilled occupational therapy services.    History # of Personal Factors and/or Comorbidities: HIGH (3+)  Examination of Body System(s): # of elements: LOW (1-2)  Clinical Presentation: STABLE   Clinical Decision Making: LOW      Evaluation:  Low Complexity:    30     mins  90559;    Timed:  Manual Therapy:    0     mins  14393;  Therapeutic Exercise:    10     mins  47161;       Untimed:  Electrical Stimulation:    0     mins  64745 ( );  Fluidotherapy:  __10_ mins  49696    Timed Treatment:   10   mins   Total Treatment:     50   mins      OT SIGNATURE: AZAR Samuel/JENNY   DATE TREATMENT INITIATED: 6/29/2021    Initial Certification  Certification Period: 9/27/2021  I certify that the therapy services are furnished while this patient is under my care.  The services outlined above are required by this patient, and will be reviewed every 90 days.     PHYSICIAN: Aspen Burns PA      DATE:     Please sign and return via fax to  [phone]   Thank you, [facility].

## 2021-07-02 ENCOUNTER — TREATMENT (OUTPATIENT)
Dept: PHYSICAL THERAPY | Facility: CLINIC | Age: 76
End: 2021-07-02

## 2021-07-02 DIAGNOSIS — M25.532 LEFT WRIST PAIN: ICD-10-CM

## 2021-07-02 DIAGNOSIS — S52.502D CLOSED FRACTURE OF DISTAL END OF LEFT RADIUS WITH ROUTINE HEALING, UNSPECIFIED FRACTURE MORPHOLOGY, SUBSEQUENT ENCOUNTER: Primary | ICD-10-CM

## 2021-07-02 PROCEDURE — 97110 THERAPEUTIC EXERCISES: CPT | Performed by: PHYSICAL THERAPIST

## 2021-07-02 PROCEDURE — 97022 WHIRLPOOL THERAPY: CPT | Performed by: PHYSICAL THERAPIST

## 2021-07-02 NOTE — PROGRESS NOTES
Occupational Therapy Daily Treatment Note      Patient: Gemma Benjamin   : 1945  Referring practitioner: JESUS ALBERTO Trejo  Date of Initial Visit: Type: THERAPY  Noted: 2021  Today's Date: 2021  Patient seen for 2 sessions         Gemma Benjamin reports:it is getting better.        Subjective Evaluation    Pain  Current pain rating: 3           Objective   See Exercise, Manual, and Modality Logs for complete treatment.       Assessment/Plan    Visit Diagnoses:    ICD-10-CM ICD-9-CM   1. Closed fracture of distal end of left radius with routine healing, unspecified fracture morphology, subsequent encounter  S52.502D V54.12   2. Left wrist pain  M25.532 719.43       Continue per POC         Timed:  Manual Therapy:    0     mins  02782;  Therapeutic Exercise:    20     mins  56228;       Untimed:  Electrical Stimulation:    0     mins  55945 ( );  Fluidotherapy        10    mins  31507    Timed Treatment:   20   mins   Total Treatment:     30   min    Kiersten Ford OTR/L  Occupational Therapist

## 2021-07-08 ENCOUNTER — TREATMENT (OUTPATIENT)
Dept: PHYSICAL THERAPY | Facility: CLINIC | Age: 76
End: 2021-07-08

## 2021-07-08 DIAGNOSIS — M25.532 LEFT WRIST PAIN: ICD-10-CM

## 2021-07-08 DIAGNOSIS — S52.502D CLOSED FRACTURE OF DISTAL END OF LEFT RADIUS WITH ROUTINE HEALING, UNSPECIFIED FRACTURE MORPHOLOGY, SUBSEQUENT ENCOUNTER: Primary | ICD-10-CM

## 2021-07-08 DIAGNOSIS — Z47.89 AFTERCARE FOLLOWING SURGERY OF THE MUSCULOSKELETAL SYSTEM: ICD-10-CM

## 2021-07-08 PROCEDURE — 97110 THERAPEUTIC EXERCISES: CPT | Performed by: PHYSICAL THERAPIST

## 2021-07-08 PROCEDURE — 97022 WHIRLPOOL THERAPY: CPT | Performed by: PHYSICAL THERAPIST

## 2021-07-08 NOTE — PROGRESS NOTES
Occupational Therapy Daily Treatment Note      Patient: Gemam Benjamin   : 1945  Referring practitioner: JESUS ALBERTO Trejo  Date of Initial Visit: Type: THERAPY  Noted: 2021  Today's Date: 2021  Patient seen for 3 sessions         Gemma Benjamin reports: it is little stiff and sore today, but I have been shoveling rock and dirt this morning.        Subjective     Objective   See Exercise, Manual, and Modality Logs for complete treatment.       Assessment/Plan    Visit Diagnoses:    ICD-10-CM ICD-9-CM   1. Closed fracture of distal end of left radius with routine healing, unspecified fracture morphology, subsequent encounter  S52.502D V54.12   2. Left wrist pain  M25.532 719.43       Continue per POC         Timed:  Manual Therapy:    0     mins  53968;  Therapeutic Exercise:    20     mins  07832;       Untimed:  Electrical Stimulation:    0     mins  39247 ( );  Fluidotherapy        10    mins  62556    Timed Treatment:   20   mins   Total Treatment:     30   min    Kiersten Ford OTR/L  Occupational Therapist

## 2021-07-13 ENCOUNTER — TREATMENT (OUTPATIENT)
Dept: PHYSICAL THERAPY | Facility: CLINIC | Age: 76
End: 2021-07-13

## 2021-07-13 DIAGNOSIS — S52.502D CLOSED FRACTURE OF DISTAL END OF LEFT RADIUS WITH ROUTINE HEALING, UNSPECIFIED FRACTURE MORPHOLOGY, SUBSEQUENT ENCOUNTER: Primary | ICD-10-CM

## 2021-07-13 DIAGNOSIS — M25.532 LEFT WRIST PAIN: ICD-10-CM

## 2021-07-13 PROCEDURE — 97110 THERAPEUTIC EXERCISES: CPT | Performed by: PHYSICAL THERAPIST

## 2021-07-13 PROCEDURE — 97022 WHIRLPOOL THERAPY: CPT | Performed by: PHYSICAL THERAPIST

## 2021-07-13 NOTE — PROGRESS NOTES
Occupational Therapy Daily Treatment Note      Patient: Gemma Benjamin   : 1945  Referring practitioner: JESUS ALBERTO Trejo  Date of Initial Visit: Type: THERAPY  Noted: 2021  Today's Date: 2021  Patient seen for 4 sessions         Gemma Benjamin reports: no pain in her wrist today, but still getting some discomfort on the ulnar side.        Subjective     Objective   See Exercise, Manual, and Modality Logs for complete treatment.       Assessment/Plan    Visit Diagnoses:    ICD-10-CM ICD-9-CM   1. Closed fracture of distal end of left radius with routine healing, unspecified fracture morphology, subsequent encounter  S52.502D V54.12   2. Left wrist pain  M25.532 719.43       Continue per POC         Timed:  Manual Therapy:    0     mins  62328;  Therapeutic Exercise:    20     mins  47488;       Untimed:  Electrical Stimulation:    0     mins  90776 ( );  Fluidotherapy        10    mins  88053    Timed Treatment:   20   mins   Total Treatment:     30   min    Kiersten Ford OTR/L  Occupational Therapist

## 2021-07-15 ENCOUNTER — TREATMENT (OUTPATIENT)
Dept: PHYSICAL THERAPY | Facility: CLINIC | Age: 76
End: 2021-07-15

## 2021-07-15 VITALS — HEART RATE: 84 BPM | OXYGEN SATURATION: 91 % | HEIGHT: 63 IN | WEIGHT: 219.12 LBS | BODY MASS INDEX: 38.82 KG/M2

## 2021-07-15 DIAGNOSIS — M25.532 LEFT WRIST PAIN: ICD-10-CM

## 2021-07-15 DIAGNOSIS — S52.502D CLOSED FRACTURE OF DISTAL END OF LEFT RADIUS WITH ROUTINE HEALING, UNSPECIFIED FRACTURE MORPHOLOGY, SUBSEQUENT ENCOUNTER: Primary | ICD-10-CM

## 2021-07-15 PROCEDURE — 97022 WHIRLPOOL THERAPY: CPT | Performed by: PHYSICAL THERAPIST

## 2021-07-15 PROCEDURE — 97110 THERAPEUTIC EXERCISES: CPT | Performed by: PHYSICAL THERAPIST

## 2021-07-15 NOTE — PROGRESS NOTES
Occupational Therapy Daily Treatment Note      Patient: Gemma Benjamin   : 1945  Referring practitioner: JESUS ALBERTO Trejo  Date of Initial Visit: Type: THERAPY  Noted: 2021  Today's Date: 7/15/2021  Patient seen for 5 sessions         Gemma Benjamin reports: it feels pretty good.        Subjective     Objective   See Exercise, Manual, and Modality Logs for complete treatment.       Assessment/Plan    Visit Diagnoses:    ICD-10-CM ICD-9-CM   1. Closed fracture of distal end of left radius with routine healing, unspecified fracture morphology, subsequent encounter  S52.502D V54.12   2. Left wrist pain  M25.532 719.43       Continue per POC         Timed:  Manual Therapy:    0     mins  98502;  Therapeutic Exercise:    20     mins  64787;       Untimed:  Electrical Stimulation:    0     mins  43874 ( );  Fluidotherapy        10    mins  54819    Timed Treatment:   20   mins   Total Treatment:     30   min    Kiresten Ford OTR/L  Occupational Therapist

## 2021-07-26 ENCOUNTER — OFFICE VISIT (OUTPATIENT)
Dept: ORTHOPEDIC SURGERY | Facility: CLINIC | Age: 76
End: 2021-07-26

## 2021-07-26 VITALS — WEIGHT: 200 LBS | HEART RATE: 84 BPM | HEIGHT: 62 IN | OXYGEN SATURATION: 96 % | BODY MASS INDEX: 36.8 KG/M2

## 2021-07-26 DIAGNOSIS — Z47.89 AFTERCARE FOLLOWING SURGERY OF THE MUSCULOSKELETAL SYSTEM: ICD-10-CM

## 2021-07-26 DIAGNOSIS — M25.532 LEFT WRIST PAIN: Primary | ICD-10-CM

## 2021-07-26 PROCEDURE — 99213 OFFICE O/P EST LOW 20 MIN: CPT | Performed by: PHYSICIAN ASSISTANT

## 2021-07-26 NOTE — ASSESSMENT & PLAN NOTE
Patient will continue with physical therapy over the next month, continue with resting icing and elevating, bracing as needed, Tylenol or ibuprofen as needed for pain relief, and home exercises.  We will follow up in 1 month for recheck, no x-ray needed at next visit.

## 2021-07-26 NOTE — PATIENT INSTRUCTIONS
Rest ice and elevate, brace with activity, Tylenol or ibuprofen as needed for pain, home exercises and PT recommended.  Follow-up in 4 to 5 weeks for recheck.

## 2021-07-26 NOTE — PROGRESS NOTES
"Chief Complaint  Pain of the Left Wrist    Subjective          Gemma Benjamin presents to [facility] for follow-up on left wrist pain.  She is status post left distal radius fracture with ORIF performed by Dr. Parry 3/25/2021.  Patient has been doing hand therapy since her last visit 6/21/2021.  She states she is doing very well, has noticed some increased strength with gripping.  She wears her brace occasionally.  Takes Tylenol occasionally for pain.  She still notes some stiffness in the wrist and digits.    Objective   Vital Signs:   Pulse 84   Ht 157.5 cm (62\")   Wt 90.7 kg (200 lb)   SpO2 96%   BMI 36.58 kg/m²       Physical Exam  Constitutional:       Appearance: Normal appearance. He is well-developed and normal weight.   HENT:      Head: Normocephalic.      Right Ear: Hearing and external ear normal.      Left Ear: Hearing and external ear normal.      Nose: Nose normal.   Eyes:      Conjunctiva/sclera: Conjunctivae normal.   Cardiovascular:      Rate and Rhythm: Normal rate.   Pulmonary:      Effort: Pulmonary effort is normal.      Breath sounds: No wheezing or rales.   Abdominal:      Palpations: Abdomen is soft.      Tenderness: There is no abdominal tenderness.   Musculoskeletal:      Cervical back: Normal range of motion.   Skin:     Findings: No rash.   Neurological:      Mental Status: He is alert and oriented to person, place, and time.   Psychiatric:         Mood and Affect: Mood and affect normal.         Judgment: Judgment normal.     Ortho Exam  Left wrist: Well-healed surgical incision without erythema dehiscence or purulent drainage.  Mild tenderness to palpation over the left distal radius, distal ulna and fifth metacarpal.  She has good wrist flexion and extension as well as supination and pronation.  She has good ulnar and radial deviation.   strength 4 out of 5 in left upper extremity compared to 5 out of 5 in right upper extremity.  Sensation to light " touch is intact, two-point discrimination intact, good range of motion of all digits on the left upper extremity, good thumb apposition.  Cap refill less than 2 seconds, radial pulse 2+.  No tenderness to palpation of the scaphoid bone/anatomic snuffbox.    Result Review :            Imaging Results (Most Recent)     Procedure Component Value Units Date/Time    XR Wrist 2 View Left [[id number]] Resulted: 07/26/21 1112     Updated: 07/26/21 1113    Narrative:      X-Ray Report:  Study: X-rays ordered, taken in the office, and reviewed today  Site: Left wrist xray  Indication: Pain  View: AP and Lateral view(s)  Findings: Well-healing left distal radius fracture with good bony healing   from prior study, all hardware intact, mild soft tissue swelling   appreciated, no other acute osseous abnormalities or malalignment.  Prior studies available for comparison: yes                   Assessment and Plan    Problem List Items Addressed This Visit        Musculoskeletal and Injuries    Left wrist pain - Primary    Relevant Orders    XR Wrist 2 View Left (Completed)    Aftercare following surgery of the musculoskeletal system    Current Assessment & Plan     Patient will continue with physical therapy over the next month, continue with resting icing and elevating, bracing as needed, Tylenol or ibuprofen as needed for pain relief, and home exercises.  We will follow up in 1 month for recheck, no x-ray needed at next visit.               Follow Up   Return in about 4 weeks (around 8/23/2021) for Recheck.  Patient Instructions   Rest ice and elevate, brace with activity, Tylenol or ibuprofen as needed for pain, home exercises and PT recommended.  Follow-up in 4 to 5 weeks for recheck.    Patient was given instructions and counseling regarding her condition or for health maintenance advice. Please see specific information pulled into the AVS if appropriate.

## 2021-07-27 ENCOUNTER — TREATMENT (OUTPATIENT)
Dept: PHYSICAL THERAPY | Facility: CLINIC | Age: 76
End: 2021-07-27

## 2021-07-27 DIAGNOSIS — M25.532 LEFT WRIST PAIN: ICD-10-CM

## 2021-07-27 DIAGNOSIS — S52.502D CLOSED FRACTURE OF DISTAL END OF LEFT RADIUS WITH ROUTINE HEALING, UNSPECIFIED FRACTURE MORPHOLOGY, SUBSEQUENT ENCOUNTER: Primary | ICD-10-CM

## 2021-07-27 PROCEDURE — 97110 THERAPEUTIC EXERCISES: CPT | Performed by: PHYSICAL THERAPIST

## 2021-07-27 PROCEDURE — 97022 WHIRLPOOL THERAPY: CPT | Performed by: PHYSICAL THERAPIST

## 2021-07-27 NOTE — PROGRESS NOTES
Occupational Therapy Daily Treatment Note      Patient: Gemma Benjamin   : 1945  Referring practitioner: JESUS ALBERTO Trejo  Date of Initial Visit: Type: THERAPY  Noted: 2021  Today's Date: 2021  Patient seen for 6 sessions         Gemma Benjamin reports: the doctor said I am coming along good. I think I am ready to just continue at home.        Subjective Evaluation    Quality of life: good    Pain  Current pain ratin           Objective          Observations     Left Wrist/Hand   Positive for edema and incision.       Neurological Testing     Sensation     Wrist/Hand   Left   Intact: light touch    Active Range of Motion     Left Wrist   Wrist flexion: 55 degrees   Wrist extension: 45 degrees   Radial deviation: 10 degrees   Ulnar deviation: 20 degrees     Additional Active Range of Motion Details  Full loose composite fist    Strength/Myotome Testing     Left Wrist/Hand      (2nd hand position)     Trial 1: 30 lbs    Trial 2: 29 lbs    Trial 3: 30 lbs    Average: 29.67 lbs    Right Wrist/Hand      (2nd hand position)     Trial 1: 38 lbs    Trial 2: 40 lbs    Trial 3: 45 lbs    Average: 41 lbs      See Exercise, Manual, and Modality Logs for complete treatment.       Assessment & Plan       Goals  Plan Goals: Plan Goals: 1. The patient complains of pain in the L wrist.                  LTG 1: 12 weeks:  The patient will report a pain rating of 2/10 or better in order to improve sleep quality and tolerance to performance of activities of daily living.                                  STATUS:  Met                  STG 1a: 6weeks:  The patient will report a pain rating of 5/10 or better at worst.                                   STATUS:Met  2. The patient has limited ROM of the L wrist                  LTG 2: 12 weeks:  The patient will demonstrate 55 degrees of wrist flex/ext to allow the patient to grasp for lifting and opening.                                  STATUS:  Not met                    STG 2a: 6 weeks:  The patient will demonstrate 50 degrees of wrist flex/ext.                                  STATUS:  Not met                             3. The patient has limited strength of the L hand.                  LTG 3: 12 weeks:  The patient will demonstrate 35 lbs L  strength in order to open jars.                                  STATUS:  Not met                  STG 3a: 6 weeks:  The patient will demonstrate 30 lbs L  strength.                                  STATUS:  Not met  4. Carrying, Moving, and Handling Objects Functional Limitation                                   LTG 4: 12 weeks:  The patient will demonstrate 1-19% limitation by achieving a score of 19/55 on the Quick DASH.                                  STATUS:  New        Visit Diagnoses:    ICD-10-CM ICD-9-CM   1. Closed fracture of distal end of left radius with routine healing, unspecified fracture morphology, subsequent encounter  S52.502D V54.12   2. Left wrist pain  M25.532 719.43       Pt gained range of motion and strength in therapy as well as decreased pain. Pt wishes to continue at home.    Discharge to [facility].         Timed:  Manual Therapy:    0     mins  72250;  Therapeutic Exercise:    20     mins  73456;       Untimed:  Electrical Stimulation:    0     mins  60222 ( );  Fluidotherapy        10    mins  39125    Timed Treatment:  20   mins   Total Treatment:     30   min    AZAR Samuel/L  Occupational Therapist

## 2021-08-06 ENCOUNTER — LAB (OUTPATIENT)
Dept: LAB | Facility: HOSPITAL | Age: 76
End: 2021-08-06

## 2021-08-06 ENCOUNTER — TRANSCRIBE ORDERS (OUTPATIENT)
Dept: ADMINISTRATIVE | Facility: HOSPITAL | Age: 76
End: 2021-08-06

## 2021-08-06 DIAGNOSIS — I10 ESSENTIAL HYPERTENSION, MALIGNANT: ICD-10-CM

## 2021-08-06 DIAGNOSIS — N18.30 MALIGNANT HYPERTENSIVE HEART AND CHRONIC KIDNEY DISEASE STAGE III (HCC): ICD-10-CM

## 2021-08-06 DIAGNOSIS — I13.10 MALIGNANT HYPERTENSIVE HEART AND CHRONIC KIDNEY DISEASE STAGE III (HCC): Primary | ICD-10-CM

## 2021-08-06 DIAGNOSIS — N18.30 MALIGNANT HYPERTENSIVE HEART AND CHRONIC KIDNEY DISEASE STAGE III (HCC): Primary | ICD-10-CM

## 2021-08-06 DIAGNOSIS — E11.9 DIABETES MELLITUS WITHOUT COMPLICATION (HCC): ICD-10-CM

## 2021-08-06 DIAGNOSIS — I13.10 MALIGNANT HYPERTENSIVE HEART AND CHRONIC KIDNEY DISEASE STAGE III (HCC): ICD-10-CM

## 2021-08-06 LAB
ALBUMIN SERPL-MCNC: 3.9 G/DL (ref 3.5–5.2)
ALBUMIN UR-MCNC: 3.2 MG/DL
ALBUMIN/GLOB SERPL: 1.2 G/DL
ALP SERPL-CCNC: 66 U/L (ref 39–117)
ALT SERPL W P-5'-P-CCNC: 16 U/L (ref 1–33)
ANION GAP SERPL CALCULATED.3IONS-SCNC: 11.8 MMOL/L (ref 5–15)
AST SERPL-CCNC: 16 U/L (ref 1–32)
BASOPHILS # BLD AUTO: 0.06 10*3/MM3 (ref 0–0.2)
BASOPHILS NFR BLD AUTO: 0.9 % (ref 0–1.5)
BILIRUB SERPL-MCNC: 0.3 MG/DL (ref 0–1.2)
BILIRUB UR QL STRIP: NEGATIVE
BUN SERPL-MCNC: 22 MG/DL (ref 8–23)
BUN/CREAT SERPL: 20.8 (ref 7–25)
CALCIUM SPEC-SCNC: 9.6 MG/DL (ref 8.6–10.5)
CHLORIDE SERPL-SCNC: 101 MMOL/L (ref 98–107)
CLARITY UR: CLEAR
CO2 SERPL-SCNC: 26.2 MMOL/L (ref 22–29)
COLOR UR: YELLOW
CREAT SERPL-MCNC: 1.06 MG/DL (ref 0.57–1)
CREAT UR-MCNC: 81.1 MG/DL
CREAT UR-MCNC: 95.5 MG/DL
DEPRECATED RDW RBC AUTO: 44.8 FL (ref 37–54)
EOSINOPHIL # BLD AUTO: 0.23 10*3/MM3 (ref 0–0.4)
EOSINOPHIL NFR BLD AUTO: 3.6 % (ref 0.3–6.2)
ERYTHROCYTE [DISTWIDTH] IN BLOOD BY AUTOMATED COUNT: 13.6 % (ref 12.3–15.4)
GFR SERPL CREATININE-BSD FRML MDRD: 50 ML/MIN/1.73
GLOBULIN UR ELPH-MCNC: 3.2 GM/DL
GLUCOSE SERPL-MCNC: 212 MG/DL (ref 65–99)
GLUCOSE UR STRIP-MCNC: NEGATIVE MG/DL
HBA1C MFR BLD: 7.4 % (ref 4.8–5.6)
HCT VFR BLD AUTO: 35.6 % (ref 34–46.6)
HGB BLD-MCNC: 11.7 G/DL (ref 12–15.9)
HGB UR QL STRIP.AUTO: NEGATIVE
IMM GRANULOCYTES # BLD AUTO: 0.03 10*3/MM3 (ref 0–0.05)
IMM GRANULOCYTES NFR BLD AUTO: 0.5 % (ref 0–0.5)
KETONES UR QL STRIP: NEGATIVE
LEUKOCYTE ESTERASE UR QL STRIP.AUTO: NEGATIVE
LYMPHOCYTES # BLD AUTO: 1.23 10*3/MM3 (ref 0.7–3.1)
LYMPHOCYTES NFR BLD AUTO: 19.2 % (ref 19.6–45.3)
MCH RBC QN AUTO: 29.5 PG (ref 26.6–33)
MCHC RBC AUTO-ENTMCNC: 32.9 G/DL (ref 31.5–35.7)
MCV RBC AUTO: 89.7 FL (ref 79–97)
MICROALBUMIN/CREAT UR: 39.5 MG/G
MONOCYTES # BLD AUTO: 0.42 10*3/MM3 (ref 0.1–0.9)
MONOCYTES NFR BLD AUTO: 6.6 % (ref 5–12)
NEUTROPHILS NFR BLD AUTO: 4.44 10*3/MM3 (ref 1.7–7)
NEUTROPHILS NFR BLD AUTO: 69.2 % (ref 42.7–76)
NITRITE UR QL STRIP: NEGATIVE
NRBC BLD AUTO-RTO: 0 /100 WBC (ref 0–0.2)
PH UR STRIP.AUTO: 7 [PH] (ref 5–8)
PLATELET # BLD AUTO: 221 10*3/MM3 (ref 140–450)
PMV BLD AUTO: 10.3 FL (ref 6–12)
POTASSIUM SERPL-SCNC: 3.9 MMOL/L (ref 3.5–5.2)
PROT SERPL-MCNC: 7.1 G/DL (ref 6–8.5)
PROT UR QL STRIP: NEGATIVE
PROT UR-MCNC: 13.1 MG/DL
PROT/CREAT UR: 0.1 MG/G{CREAT}
RBC # BLD AUTO: 3.97 10*6/MM3 (ref 3.77–5.28)
SODIUM SERPL-SCNC: 139 MMOL/L (ref 136–145)
SP GR UR STRIP: 1.02 (ref 1–1.03)
UROBILINOGEN UR QL STRIP: NORMAL
WBC # BLD AUTO: 6.41 10*3/MM3 (ref 3.4–10.8)

## 2021-08-06 PROCEDURE — 80053 COMPREHEN METABOLIC PANEL: CPT

## 2021-08-06 PROCEDURE — 81003 URINALYSIS AUTO W/O SCOPE: CPT

## 2021-08-06 PROCEDURE — 36415 COLL VENOUS BLD VENIPUNCTURE: CPT

## 2021-08-06 PROCEDURE — 82043 UR ALBUMIN QUANTITATIVE: CPT

## 2021-08-06 PROCEDURE — 84156 ASSAY OF PROTEIN URINE: CPT

## 2021-08-06 PROCEDURE — 82570 ASSAY OF URINE CREATININE: CPT

## 2021-08-06 PROCEDURE — 83036 HEMOGLOBIN GLYCOSYLATED A1C: CPT

## 2021-08-06 PROCEDURE — 85025 COMPLETE CBC W/AUTO DIFF WBC: CPT

## 2021-08-30 ENCOUNTER — OFFICE VISIT (OUTPATIENT)
Dept: ORTHOPEDIC SURGERY | Facility: CLINIC | Age: 76
End: 2021-08-30

## 2021-08-30 VITALS — HEIGHT: 62 IN | BODY MASS INDEX: 36.8 KG/M2 | RESPIRATION RATE: 14 BRPM | WEIGHT: 200 LBS

## 2021-08-30 DIAGNOSIS — Z47.89 AFTERCARE FOLLOWING SURGERY OF THE MUSCULOSKELETAL SYSTEM: Primary | ICD-10-CM

## 2021-08-30 PROCEDURE — 99212 OFFICE O/P EST SF 10 MIN: CPT | Performed by: PHYSICIAN ASSISTANT

## 2021-08-30 NOTE — ASSESSMENT & PLAN NOTE
Recommend continuation of home exercises.  She will follow-up on an as-needed basis moving forward.

## 2021-08-30 NOTE — PATIENT INSTRUCTIONS
Recommend continuation of home exercises.  She will follow-up on an as-needed basis moving forward

## 2021-08-30 NOTE — PROGRESS NOTES
"Chief Complaint  Pain of the Left Wrist    Subjective          Gemma Benjamin presents to [facility] for follow-up on right wrist status post left distal radius fracture with ORIF performed by Dr. Jolly 3/25/2021.  She has been doing some physical therapy, recently completed her visits and states she is very happy with her range of motion.  She is continue doing home exercises daily.  She occasionally gets an ache on the dorsal aspect of the wrist.  She states she wears a brace occasionally.  She takes Tylenol occasionally.    Objective   Vital Signs:   Resp 14   Ht 157.5 cm (62\")   Wt 90.7 kg (200 lb)   BMI 36.58 kg/m²       Physical Exam  Constitutional:       Appearance: Normal appearance. Patient is well-developed and normal weight.   HENT:      Head: Normocephalic.      Right Ear: Hearing and external ear normal.      Left Ear: Hearing and external ear normal.      Nose: Nose normal.   Eyes:      Conjunctiva/sclera: Conjunctivae normal.   Cardiovascular:      Rate and Rhythm: Normal rate.   Pulmonary:      Effort: Pulmonary effort is normal.      Breath sounds: No wheezing or rales.   Abdominal:      Palpations: Abdomen is soft.      Tenderness: There is no abdominal tenderness.   Musculoskeletal:      Cervical back: Normal range of motion.   Skin:     Findings: No rash.   Neurological:      Mental Status: Patient is alert and oriented to person, place, and time.   Psychiatric:         Mood and Affect: Mood and affect normal.         Judgment: Judgment normal.     Ortho Exam  Left wrist: Moderate swelling about the left wrist, no tenderness to palpation, skin is intact.  She has good flexion extension supination and pronation,  strength symmetric and within normal limits bilaterally, good range of motion all digits on bilateral upper extremities.  Radial pulse 2+, cap refill less than 2 seconds, sensation light touch is intact.  No scaphoid tenderness to palpation.  Result " Review :            Imaging Results (Most Recent)     None                Assessment and Plan    Problem List Items Addressed This Visit        Musculoskeletal and Injuries    Aftercare following surgery of the musculoskeletal system - Primary    Current Assessment & Plan     Recommend continuation of home exercises.  She will follow-up on an as-needed basis moving forward.               Follow Up   Return if symptoms worsen or fail to improve.  Patient Instructions   Recommend continuation of home exercises.  She will follow-up on an as-needed basis moving forward    Patient was given instructions and counseling regarding her condition or for health maintenance advice. Please see specific information pulled into the AVS if appropriate.

## 2021-12-01 ENCOUNTER — TRANSCRIBE ORDERS (OUTPATIENT)
Dept: ADMINISTRATIVE | Facility: HOSPITAL | Age: 76
End: 2021-12-01

## 2021-12-01 DIAGNOSIS — R09.89 BRUIT: Primary | ICD-10-CM

## 2021-12-06 ENCOUNTER — HOSPITAL ENCOUNTER (OUTPATIENT)
Dept: ULTRASOUND IMAGING | Facility: HOSPITAL | Age: 76
Discharge: HOME | End: 2021-12-06
Admitting: OTOLARYNGOLOGY

## 2021-12-06 DIAGNOSIS — E04.2 MULTIPLE THYROID NODULES: ICD-10-CM

## 2021-12-06 PROCEDURE — 76536 US EXAM OF HEAD AND NECK: CPT

## 2021-12-07 ENCOUNTER — APPOINTMENT (OUTPATIENT)
Dept: CARDIOLOGY | Facility: HOSPITAL | Age: 76
End: 2021-12-07

## 2021-12-09 ENCOUNTER — HOSPITAL ENCOUNTER (OUTPATIENT)
Dept: CARDIOLOGY | Facility: HOSPITAL | Age: 76
Discharge: HOME | End: 2021-12-09
Admitting: NURSE PRACTITIONER

## 2021-12-09 DIAGNOSIS — R09.89 BRUIT: ICD-10-CM

## 2021-12-09 LAB
BH CV XLRA MEAS LEFT CAROTID BULB EDV: 20 CM/SEC
BH CV XLRA MEAS LEFT CAROTID BULB PSV: 80 CM/SEC
BH CV XLRA MEAS LEFT DIST CCA EDV: 29 CM/SEC
BH CV XLRA MEAS LEFT DIST CCA PSV: 90 CM/SEC
BH CV XLRA MEAS LEFT DIST ICA EDV: 32 CM/SEC
BH CV XLRA MEAS LEFT DIST ICA PSV: 88 CM/SEC
BH CV XLRA MEAS LEFT MID ICA EDV: 27 CM/SEC
BH CV XLRA MEAS LEFT MID ICA PSV: 97 CM/SEC
BH CV XLRA MEAS LEFT PROX CCA EDV: 20 CM/SEC
BH CV XLRA MEAS LEFT PROX CCA PSV: 104 CM/SEC
BH CV XLRA MEAS LEFT PROX ECA EDV: 21 CM/SEC
BH CV XLRA MEAS LEFT PROX ECA PSV: 114 CM/SEC
BH CV XLRA MEAS LEFT PROX ICA EDV: 19 CM/SEC
BH CV XLRA MEAS LEFT PROX ICA PSV: 113 CM/SEC
BH CV XLRA MEAS LEFT VERTEBRAL A EDV: 14 CM/SEC
BH CV XLRA MEAS LEFT VERTEBRAL A PSV: 53 CM/SEC
BH CV XLRA MEAS RIGHT CAROTID BULB EDV: 11 CM/SEC
BH CV XLRA MEAS RIGHT CAROTID BULB PSV: 82 CM/SEC
BH CV XLRA MEAS RIGHT DIST CCA EDV: 13 CM/SEC
BH CV XLRA MEAS RIGHT DIST CCA PSV: 75 CM/SEC
BH CV XLRA MEAS RIGHT DIST ICA EDV: 17 CM/SEC
BH CV XLRA MEAS RIGHT DIST ICA PSV: 62 CM/SEC
BH CV XLRA MEAS RIGHT MID ICA EDV: 19 CM/SEC
BH CV XLRA MEAS RIGHT MID ICA PSV: 76 CM/SEC
BH CV XLRA MEAS RIGHT PROX CCA EDV: 15 CM/SEC
BH CV XLRA MEAS RIGHT PROX CCA PSV: 132 CM/SEC
BH CV XLRA MEAS RIGHT PROX ECA EDV: 11 CM/SEC
BH CV XLRA MEAS RIGHT PROX ECA PSV: 94 CM/SEC
BH CV XLRA MEAS RIGHT PROX ICA EDV: 16 CM/SEC
BH CV XLRA MEAS RIGHT PROX ICA PSV: 86 CM/SEC
BH CV XLRA MEAS RIGHT VERTEBRAL A EDV: 18 CM/SEC
BH CV XLRA MEAS RIGHT VERTEBRAL A PSV: 57 CM/SEC
LEFT ARM BP: 125 MMHG
MAXIMAL PREDICTED HEART RATE: 143 BPM
RIGHT ARM BP: 135 MMHG
STRESS TARGET HR: 122 BPM

## 2021-12-09 PROCEDURE — 93880 EXTRACRANIAL BILAT STUDY: CPT | Performed by: SURGERY

## 2021-12-09 PROCEDURE — 93880 EXTRACRANIAL BILAT STUDY: CPT

## 2021-12-09 RX ORDER — AMLODIPINE BESYLATE 5 MG/1
TABLET ORAL
COMMUNITY
Start: 2021-11-19

## 2021-12-09 RX ORDER — BLOOD SUGAR DIAGNOSTIC
STRIP MISCELLANEOUS
COMMUNITY
Start: 2021-11-29

## 2021-12-09 RX ORDER — ROSUVASTATIN CALCIUM 40 MG/1
TABLET, COATED ORAL
COMMUNITY
Start: 2021-11-24

## 2021-12-16 ENCOUNTER — OFFICE VISIT (OUTPATIENT)
Dept: OTOLARYNGOLOGY | Facility: CLINIC | Age: 76
End: 2021-12-16

## 2021-12-16 ENCOUNTER — LAB (OUTPATIENT)
Dept: LAB | Facility: HOSPITAL | Age: 76
End: 2021-12-16

## 2021-12-16 VITALS — WEIGHT: 218 LBS | BODY MASS INDEX: 40.12 KG/M2 | HEIGHT: 62 IN | TEMPERATURE: 96.4 F

## 2021-12-16 DIAGNOSIS — E04.1 THYROID NODULE: Primary | ICD-10-CM

## 2021-12-16 DIAGNOSIS — E04.1 THYROID NODULE: ICD-10-CM

## 2021-12-16 DIAGNOSIS — R13.10 DYSPHAGIA, UNSPECIFIED TYPE: ICD-10-CM

## 2021-12-16 LAB
T4 FREE SERPL-MCNC: 1.08 NG/DL (ref 0.93–1.7)
TSH SERPL DL<=0.05 MIU/L-ACNC: 3.66 UIU/ML (ref 0.27–4.2)

## 2021-12-16 PROCEDURE — 99213 OFFICE O/P EST LOW 20 MIN: CPT | Performed by: OTOLARYNGOLOGY

## 2021-12-16 PROCEDURE — 84439 ASSAY OF FREE THYROXINE: CPT

## 2021-12-16 PROCEDURE — 36415 COLL VENOUS BLD VENIPUNCTURE: CPT

## 2021-12-16 PROCEDURE — 84443 ASSAY THYROID STIM HORMONE: CPT

## 2021-12-16 NOTE — PROGRESS NOTES
Patient Name: Gemma Benjamin   Visit Date: 12/16/2021   Patient ID: [id number]  Provider: Wali Fowler MD    Sex: female  Location: [facility]   YOB: 1945  Location Address: [address] [place] [address], [address] [place] [address],?[place]?[id number]    Primary Care Provider Alice Lino APRN  Location Phone: [phone]    Referring Provider: No ref. provider found        Chief Complaint  Thyroid Problem    History of Present Illness  Gemma Benjamin is a 76 y.o. female with past medical history significant for hypertension who returns today for follow-up of thyroid nodules.  She was originally seen on 1/27/2021 after having seen Dr. Seay who is an endocrine surgeon in [place]. She denied any issues with dysphagia, hoarseness, or stridor. She denies any family history of thyroid cancer but does report a personal history of radiation exposure while undergoing radiation therapy for breast cancer.  Thyroid ultrasound on 6/2/2020 revealed a right 1.6 cm hypoechoic nodule, a right 2.6 cm isoechoic nodule, a right 0.9 cm hypoechoic nodule, and 3 left-sided nodules with the largest measuring 1.9 cm. Subsequent ultrasound-guided FNA of the 2 largest right-sided nodules on 6/8/2020 was found to be consistent with colloid nodules (Monroe category 2).  Thyroid ultrasound on 12/2/2020 revealed an increase in the hypoechoic right 1.6 cm nodule to 2.1 cm.  Examination that day was unremarkable and we elected to pursue continued observation.    She returns today for follow-up. Intermittent coughing after swallowing liquids. No previous pneumonias. She reports frequent fatigue. Thyroid ultrasound on 12/6/2021 revealed a left 2.1 cm isoechoic nodule, left 0.6 cm nodule, right inferior 2.5 cm isoechoic nodule, right 1.4 cm hypoechoic nodule, right 1.4 cm heterogeneous nodule, right 1.9 cm nodule, and right 2.4 cm nodule.  The right lobe measures 7.8 cm in greatest dimension. As an aside, she mentions that she  fell couple weeks ago striking the left side of her face. She was experiencing numbness in her cheek since the fall. She denies any blurry vision or double vision. She has not had any issues with trismus or malocclusion. She does report some blood-tinged mucus from her nose.      Past Medical History:   Diagnosis Date   • Acid reflux    • Arthritis    • Asthma    • Cancer (HCC)    • Diabetes (HCC)    • GERD (gastroesophageal reflux disease)    • Hyperlipemia    • Hyperlipidemia    • Seasonal allergies    • Sleep apnea    • Thyroid disorder    • Thyroid nodule     nontoxic multinodular goiter   • Type 2 diabetes mellitus (HCC)        Past Surgical History:   Procedure Laterality Date   • BREAST LUMPECTOMY Left 2009   • US GUIDED FINE NEEDLE ASPIRATION  6/8/2020   • US GUIDED FINE NEEDLE ASPIRATION  6/8/2020         Current Outpatient Medications:   •  Accu-Chek Dalia Plus test strip, , Disp: , Rfl:   •  amLODIPine (NORVASC) 5 MG tablet, , Disp: , Rfl:   •  busPIRone (BUSPAR) 10 MG tablet, Take 10 mg by mouth., Disp: , Rfl:   •  Cholecalciferol 50 MCG (2000 UT) tablet, Vitamin D3 50 mcg (2,000 unit) oral tablet take 1 tablet by oral route 2 times a day   Active, Disp: , Rfl:   •  escitalopram (LEXAPRO) 20 MG tablet, , Disp: , Rfl:   •  indapamide (LOZOL) 2.5 MG tablet, , Disp: , Rfl:   •  insulin aspart (novoLOG) 100 UNIT/ML injection, INJECT 100 UNITS DAILY VIA PUMP, Disp: , Rfl:   •  Insulin Disposable Pump (OmniPod Dash 5 Pack Pods) misc, USE 1 POD EVERY 48 HOURS, Disp: , Rfl:   •  Insulin Pen Needle (Droplet Pen Needles) 32G X 6 MM misc, USE FOR INJECTION DAILY., Disp: , Rfl:   •  losartan (COZAAR) 100 MG tablet, , Disp: , Rfl:   •  rosuvastatin (CRESTOR) 40 MG tablet, , Disp: , Rfl:   •  traMADol (ULTRAM) 50 MG tablet, Take 50 mg by mouth Every 8 (Eight) Hours As Needed., Disp: , Rfl:      Allergies   Allergen Reactions   • Statins Myalgia   • Sulfa Antibiotics Headache     Severe headache         Social History  "    Tobacco Use   • Smoking status: Never Smoker   • Smokeless tobacco: Never Used   Substance Use Topics   • Alcohol use: Yes     Comment: rarely drinks, has been drinking for 31 or more years   • Drug use: Never        Objective     Vital Signs:   Temp 96.4 °F (35.8 °C) (Temporal)   Ht 157.5 cm (62\")   Wt 98.9 kg (218 lb)   BMI 39.87 kg/m²       Physical Exam    General: Well developed, well nourished patient of stated age in no acute distress. Voice is strong and clear.   Head: Normocephalic and atraumatic.  Face: No lesions but some vitiligo changes to her forehead.  Bilateral parotid and submandibular glands are unremarkable.  Stensen's and Warthin's ducts are productive of clear saliva bilaterally.  House-Brackmann I/VI     bilaterally.   muscles and temporomandibular joint nontender to palpation.  No TMJ crepitus.  Eyes: PERRLA, sclerae anicteric, no conjunctival injection. Extra ocular movements are intact and full. No nystagmus.   Ears: Auricles are normal in appearance. Bilateral external auditory canals are unremarkable. Bilateral tympanic membranes are clear and without effusion. Hearing normal to conversational voice.   Nose: External nose is normal in appearance. Bilateral nares are patent with normal appearing mucosa. Septum midline. Turbinates are unremarkable. No lesions.   Oral Cavity: Lips are normal in appearance. Oral mucosa is unremarkable. Gingiva is unremarkable. Partial dentition for age. Tongue is unremarkable with good movement. Hard palate is unremarkable.   Oropharynx: Soft palate is unremarkable with full movement. Uvula is unremarkable. Bilateral tonsils are unremarkable. Posterior oropharynx is unremarkable.    Larynx and hypopharynx: Deferred secondary to gag reflex.  Neck: Supple.  No mass.  Nontender to palpation.  Trachea midline. Thyroid normal size and without nodules to palpation.   Lymphatic: No lymphadenopathy upon palpation.  Respiratory: Clear to auscultation " bilaterally, nonlabored respirations    Cardiovascular: RRR, no murmurs, rubs, or gallops,   Psychiatric: Appropriate affect, cooperative   Neurologic: Oriented x 3, strength symmetric in all extremities, Cranial Nerves II-XII are grossly intact to confrontation. Mild paresthesia in V2 on the left.   Skin: Warm and dry. No rashes.    Procedures           Result Review :               Assessment and Plan    Diagnoses and all orders for this visit:    1. Thyroid nodule (Primary)  -     US Thyroid; Future  -     TSH+Free T4; Future  -     TSH+Free T4; Future    2. Dysphagia, unspecified type      Impressions and findings were discussed. Currently, she is seen for follow-up of thyroid nodules but also mentions occasional issues with dysphagia and recent trauma to her left malar region. We discussed the differential for trauma including V2 dysfunction from ecchymosis versus orbital floor fracture. There is no evidence of enophthalmos or entrapment on examination today. We will therefore hold off on any imaging. In regards to her thyroid nodules we reviewed the images from her 12/6/2021 ultrasound. We discussed that the right inferior 2.5 cm nodule was not well-defined on last year's ultrasound. Options for management were discussed including continued observation versus FNA were discussed. After a thorough discussion we will pursue repeat ultrasound in 1 year. We will also obtain thyroid function testing today.    Follow Up   No follow-ups on file.  Patient was given instructions and counseling regarding her condition or for health maintenance advice. Please see specific information pulled into the AVS if appropriate.

## 2022-02-09 ENCOUNTER — TRANSCRIBE ORDERS (OUTPATIENT)
Dept: ADMINISTRATIVE | Facility: HOSPITAL | Age: 77
End: 2022-02-09

## 2022-02-09 DIAGNOSIS — E04.1 THYROID NODULE: Primary | ICD-10-CM

## 2022-02-21 ENCOUNTER — LAB (OUTPATIENT)
Dept: LAB | Facility: HOSPITAL | Age: 77
End: 2022-02-21

## 2022-02-21 ENCOUNTER — TRANSCRIBE ORDERS (OUTPATIENT)
Dept: ADMINISTRATIVE | Facility: HOSPITAL | Age: 77
End: 2022-02-21

## 2022-02-21 DIAGNOSIS — I10 ESSENTIAL HYPERTENSION, MALIGNANT: ICD-10-CM

## 2022-02-21 DIAGNOSIS — E11.9 DIABETES MELLITUS WITHOUT COMPLICATION: ICD-10-CM

## 2022-02-21 DIAGNOSIS — N18.30 STAGE 3 CHRONIC KIDNEY DISEASE, UNSPECIFIED WHETHER STAGE 3A OR 3B CKD: Primary | ICD-10-CM

## 2022-02-21 DIAGNOSIS — N18.30 STAGE 3 CHRONIC KIDNEY DISEASE, UNSPECIFIED WHETHER STAGE 3A OR 3B CKD: ICD-10-CM

## 2022-02-21 LAB
ALBUMIN SERPL-MCNC: 4.1 G/DL (ref 3.5–5.2)
ALBUMIN UR-MCNC: 1.4 MG/DL
ALBUMIN/GLOB SERPL: 1.4 G/DL
ALP SERPL-CCNC: 62 U/L (ref 39–117)
ALT SERPL W P-5'-P-CCNC: 18 U/L (ref 1–33)
ANION GAP SERPL CALCULATED.3IONS-SCNC: 11 MMOL/L (ref 5–15)
AST SERPL-CCNC: 19 U/L (ref 1–32)
BASOPHILS # BLD AUTO: 0.06 10*3/MM3 (ref 0–0.2)
BASOPHILS NFR BLD AUTO: 1.1 % (ref 0–1.5)
BILIRUB SERPL-MCNC: 0.3 MG/DL (ref 0–1.2)
BILIRUB UR QL STRIP: NEGATIVE
BUN SERPL-MCNC: 25 MG/DL (ref 8–23)
BUN/CREAT SERPL: 21.7 (ref 7–25)
CALCIUM SPEC-SCNC: 9.7 MG/DL (ref 8.6–10.5)
CHLORIDE SERPL-SCNC: 101 MMOL/L (ref 98–107)
CLARITY UR: CLEAR
CO2 SERPL-SCNC: 29 MMOL/L (ref 22–29)
COLOR UR: YELLOW
CREAT SERPL-MCNC: 1.15 MG/DL (ref 0.57–1)
CREAT UR-MCNC: 102 MG/DL
CREAT UR-MCNC: 102 MG/DL
DEPRECATED RDW RBC AUTO: 41.7 FL (ref 37–54)
EOSINOPHIL # BLD AUTO: 0.23 10*3/MM3 (ref 0–0.4)
EOSINOPHIL NFR BLD AUTO: 4.3 % (ref 0.3–6.2)
ERYTHROCYTE [DISTWIDTH] IN BLOOD BY AUTOMATED COUNT: 13.4 % (ref 12.3–15.4)
GFR SERPL CREATININE-BSD FRML MDRD: 46 ML/MIN/1.73
GLOBULIN UR ELPH-MCNC: 3 GM/DL
GLUCOSE SERPL-MCNC: 139 MG/DL (ref 65–99)
GLUCOSE UR STRIP-MCNC: NEGATIVE MG/DL
HBA1C MFR BLD: 8.5 % (ref 4.8–5.6)
HCT VFR BLD AUTO: 36.9 % (ref 34–46.6)
HGB BLD-MCNC: 12.5 G/DL (ref 12–15.9)
HGB UR QL STRIP.AUTO: NEGATIVE
IMM GRANULOCYTES # BLD AUTO: 0.02 10*3/MM3 (ref 0–0.05)
IMM GRANULOCYTES NFR BLD AUTO: 0.4 % (ref 0–0.5)
KETONES UR QL STRIP: NEGATIVE
LEUKOCYTE ESTERASE UR QL STRIP.AUTO: NEGATIVE
LYMPHOCYTES # BLD AUTO: 1.36 10*3/MM3 (ref 0.7–3.1)
LYMPHOCYTES NFR BLD AUTO: 25.4 % (ref 19.6–45.3)
MCH RBC QN AUTO: 29.3 PG (ref 26.6–33)
MCHC RBC AUTO-ENTMCNC: 33.9 G/DL (ref 31.5–35.7)
MCV RBC AUTO: 86.4 FL (ref 79–97)
MICROALBUMIN/CREAT UR: 13.7 MG/G
MONOCYTES # BLD AUTO: 0.44 10*3/MM3 (ref 0.1–0.9)
MONOCYTES NFR BLD AUTO: 8.2 % (ref 5–12)
NEUTROPHILS NFR BLD AUTO: 3.24 10*3/MM3 (ref 1.7–7)
NEUTROPHILS NFR BLD AUTO: 60.6 % (ref 42.7–76)
NITRITE UR QL STRIP: NEGATIVE
NRBC BLD AUTO-RTO: 0 /100 WBC (ref 0–0.2)
PH UR STRIP.AUTO: 6.5 [PH] (ref 5–8)
PLATELET # BLD AUTO: 252 10*3/MM3 (ref 140–450)
PMV BLD AUTO: 9.9 FL (ref 6–12)
POTASSIUM SERPL-SCNC: 4.3 MMOL/L (ref 3.5–5.2)
PROT ?TM UR-MCNC: 11.3 MG/DL
PROT SERPL-MCNC: 7.1 G/DL (ref 6–8.5)
PROT UR QL STRIP: NEGATIVE
PROT/CREAT UR: 110.8 MG/G CREA (ref 0–200)
RBC # BLD AUTO: 4.27 10*6/MM3 (ref 3.77–5.28)
SODIUM SERPL-SCNC: 141 MMOL/L (ref 136–145)
SP GR UR STRIP: 1.02 (ref 1–1.03)
UROBILINOGEN UR QL STRIP: NORMAL
WBC NRBC COR # BLD: 5.35 10*3/MM3 (ref 3.4–10.8)

## 2022-02-21 PROCEDURE — 81003 URINALYSIS AUTO W/O SCOPE: CPT

## 2022-02-21 PROCEDURE — 82043 UR ALBUMIN QUANTITATIVE: CPT

## 2022-02-21 PROCEDURE — 83036 HEMOGLOBIN GLYCOSYLATED A1C: CPT

## 2022-02-21 PROCEDURE — 80053 COMPREHEN METABOLIC PANEL: CPT

## 2022-02-21 PROCEDURE — 84156 ASSAY OF PROTEIN URINE: CPT

## 2022-02-21 PROCEDURE — 85025 COMPLETE CBC W/AUTO DIFF WBC: CPT

## 2022-02-21 PROCEDURE — 36415 COLL VENOUS BLD VENIPUNCTURE: CPT

## 2022-02-21 PROCEDURE — 82570 ASSAY OF URINE CREATININE: CPT

## 2022-02-23 RX ORDER — DULOXETIN HYDROCHLORIDE 60 MG/1
60 CAPSULE, DELAYED RELEASE ORAL DAILY
COMMUNITY
Start: 2021-12-28 | End: 2022-12-28

## 2022-02-24 ENCOUNTER — OFFICE VISIT (OUTPATIENT)
Dept: OTOLARYNGOLOGY | Facility: CLINIC | Age: 77
End: 2022-02-24

## 2022-02-24 VITALS — WEIGHT: 218 LBS | TEMPERATURE: 96.4 F | BODY MASS INDEX: 40.12 KG/M2 | HEIGHT: 62 IN

## 2022-02-24 DIAGNOSIS — K11.8 PAROTID MASS: Primary | ICD-10-CM

## 2022-02-24 PROCEDURE — 99213 OFFICE O/P EST LOW 20 MIN: CPT | Performed by: OTOLARYNGOLOGY

## 2022-02-24 NOTE — PROGRESS NOTES
Patient Name: Gemma Benjamin   Visit Date: 02/24/2022   Patient ID: [id number]  Provider: Wali Fowler MD    Sex: female  [place] [facility]   YOB: 1945  Location Address: [address] [place] [address], [address] [place] [address],?[place]?[id number]    Primary Care Provider Alice Lino APRN  Location Phone: [phone]    Referring Provider: NAT Byrd        Chief Complaint  Parotid mass    History of Present Illness  Gemma Benjamin is a 76 y.o. female with past medical history significant for hypertension who returns today for follow-up of thyroid nodules.  She was originally seen on 1/27/2021 after having seen Dr. Seay who is an endocrine surgeon in [place]. She denied any issues with dysphagia, hoarseness, or stridor. She denies any family history of thyroid cancer but does report a personal history of radiation exposure while undergoing radiation therapy for breast cancer.  Thyroid ultrasound on 6/2/2020 revealed a right 1.6 cm hypoechoic nodule, a right 2.6 cm isoechoic nodule, a right 0.9 cm hypoechoic nodule, and 3 left-sided nodules with the largest measuring 1.9 cm. Subsequent ultrasound-guided FNA of the 2 largest right-sided nodules on 6/8/2020 was found to be consistent with colloid nodules ([place] category 2).  Thyroid ultrasound on 12/2/2020 revealed an increase in the hypoechoic right 1.6 cm nodule to 2.1 cm.  Examination that day was unremarkable and we elected to pursue continued observation. Thyroid ultrasound on 12/6/2021 revealed a left 2.1 cm isoechoic nodule, left 0.6 cm nodule, right inferior 2.5 cm isoechoic nodule, right 1.4 cm hypoechoic nodule, right 1.4 cm heterogeneous nodule, right 1.9 cm nodule, and right 2.4 cm nodule.  The right lobe measures 7.8 cm in greatest dimension.     She returns today for evaluation of her parotids.  She underwent a CTA of her neck for evaluation of carotid stenosis and a 0.8 cm right parotid mass and 1 cm left parotid  mass are noted. She feels like they may be getting larger.  She denies any trismus or facial weakness. She tells me she is scheduled for an FNA in the next two weeks.  She has never smoked.    Past Medical History:   Diagnosis Date   • Acid reflux    • Arthritis    • Asthma    • Cancer (HCC)    • Diabetes (HCC)    • GERD (gastroesophageal reflux disease)    • Hyperlipemia    • Hyperlipidemia    • Parotid mass    • Seasonal allergies    • Sleep apnea    • Thyroid disorder    • Thyroid nodule     nontoxic multinodular goiter   • Type 2 diabetes mellitus (HCC)        Past Surgical History:   Procedure Laterality Date   • BREAST LUMPECTOMY Left 2009   • US GUIDED FINE NEEDLE ASPIRATION  6/8/2020   • US GUIDED FINE NEEDLE ASPIRATION  6/8/2020         Current Outpatient Medications:   •  Accu-Chek Dalia Plus test strip, , Disp: , Rfl:   •  amLODIPine (NORVASC) 5 MG tablet, , Disp: , Rfl:   •  busPIRone (BUSPAR) 10 MG tablet, Take 10 mg by mouth., Disp: , Rfl:   •  Cholecalciferol 50 MCG (2000 UT) tablet, Vitamin D3 50 mcg (2,000 unit) oral tablet take 1 tablet by oral route 2 times a day   Active, Disp: , Rfl:   •  DULoxetine (CYMBALTA) 60 MG capsule, Take 60 mg by mouth Daily., Disp: , Rfl:   •  indapamide (LOZOL) 2.5 MG tablet, , Disp: , Rfl:   •  insulin aspart (novoLOG) 100 UNIT/ML injection, INJECT 100 UNITS DAILY VIA PUMP, Disp: , Rfl:   •  Insulin Disposable Pump (OmniPod Dash 5 Pack Pods) misc, USE 1 POD EVERY 48 HOURS, Disp: , Rfl:   •  Insulin Pen Needle (Droplet Pen Needles) 32G X 6 MM misc, USE FOR INJECTION DAILY., Disp: , Rfl:   •  losartan (COZAAR) 100 MG tablet, , Disp: , Rfl:   •  rosuvastatin (CRESTOR) 40 MG tablet, , Disp: , Rfl:   •  escitalopram (LEXAPRO) 20 MG tablet, , Disp: , Rfl:   •  traMADol (ULTRAM) 50 MG tablet, Take 50 mg by mouth Every 8 (Eight) Hours As Needed., Disp: , Rfl:      Allergies   Allergen Reactions   • Statins Myalgia   • Sulfa Antibiotics Headache     Severe headache    "      Social History     Tobacco Use   • Smoking status: Never Smoker   • Smokeless tobacco: Never Used   Substance Use Topics   • Alcohol use: Yes     Comment: rarely drinks, has been drinking for 31 or more years   • Drug use: Never        Objective     Vital Signs:   Temp 96.4 °F (35.8 °C) (Temporal)   Ht 157.5 cm (62\")   Wt 98.9 kg (218 lb)   BMI 39.87 kg/m²       Physical Exam    General: Well developed, well nourished patient of stated age in no acute distress. Voice is strong and clear.   Head: Normocephalic and atraumatic.  Face: No lesions but some vitiligo changes to her forehead.  Left-sided parotid mass is faintly palpable but is nontender and mobile to palpation.  Bilateral submandibular glands are unremarkable.  Stensen's and Warthin's ducts are productive of clear saliva bilaterally.  House-Brackmann I/VI     bilaterally.   muscles and temporomandibular joint nontender to palpation.  No TMJ crepitus.  Eyes: PERRLA, sclerae anicteric, no conjunctival injection. Extra ocular movements are intact and full. No nystagmus.   Ears: Auricles are normal in appearance. Bilateral external auditory canals are unremarkable. Bilateral tympanic membranes are clear and without effusion. Hearing normal to conversational voice.   Nose: External nose is normal in appearance. Bilateral nares are patent with normal appearing mucosa. Septum midline. Turbinates are unremarkable. No lesions.   Oral Cavity: Lips are normal in appearance. Oral mucosa is unremarkable. Gingiva is unremarkable. Partial dentition for age. Tongue is unremarkable with good movement. Hard palate is unremarkable.   Oropharynx: Soft palate is unremarkable with full movement. Uvula is unremarkable. Bilateral tonsils are unremarkable. Posterior oropharynx is unremarkable.    Larynx and hypopharynx: Deferred secondary to gag reflex.  Neck: Supple.  No mass.  Nontender to palpation.  Trachea midline. Thyroid normal size and without nodules to " palpation.   Lymphatic: No lymphadenopathy upon palpation.   Psychiatric: Appropriate affect, cooperative   Neurologic: Oriented x 3, strength symmetric in all extremities, Cranial Nerves II-XII are grossly intact to confrontation. Mild paresthesia in V2 on the left.   Skin: Warm and dry. No rashes.    Procedures           Result Review :               Assessment and Plan    Diagnoses and all orders for this visit:    1. Parotid mass (Primary)      Impressions and findings were discussed. Currently, we reviewed and discussed the results of 1/20/2022 CTA of the neck which revealed multiple bilateral parotid masses with the largest measuring 0.8 cm on the right and 1 cm on the left.  The left-sided mass is faintly palpable on examination today.  She feels as though there may be enlarging slightly but tells me she is scheduled to undergo an ultrasound-guided FNA in the near future.  We discussed the differential including benign lymph nodes versus infectious, inflammatory, neoplastic, and congenital possibilities.  I have asked that she follow-up with me after her FNA to discuss the results as well as further options for evaluation and management.  Follow Up   Return in about 4 weeks (around 3/24/2022).  Patient was given instructions and counseling regarding her condition or for health maintenance advice. Please see specific information pulled into the AVS if appropriate.

## 2022-03-07 ENCOUNTER — HOSPITAL ENCOUNTER (OUTPATIENT)
Dept: ULTRASOUND IMAGING | Facility: HOSPITAL | Age: 77
Discharge: HOME | End: 2022-03-07
Admitting: NURSE PRACTITIONER

## 2022-03-07 DIAGNOSIS — E04.1 THYROID NODULE: ICD-10-CM

## 2022-03-07 PROCEDURE — 76536 US EXAM OF HEAD AND NECK: CPT

## 2022-03-17 ENCOUNTER — TRANSCRIBE ORDERS (OUTPATIENT)
Dept: ADMINISTRATIVE | Facility: HOSPITAL | Age: 77
End: 2022-03-17

## 2022-03-17 ENCOUNTER — TRANSCRIBE ORDERS (OUTPATIENT)
Dept: LAB | Facility: HOSPITAL | Age: 77
End: 2022-03-17

## 2022-03-17 DIAGNOSIS — Z01.818 PREOP TESTING: Primary | ICD-10-CM

## 2022-03-17 DIAGNOSIS — E04.1 THYROID NODULE: Primary | ICD-10-CM

## 2022-03-18 ENCOUNTER — LAB (OUTPATIENT)
Dept: LAB | Facility: HOSPITAL | Age: 77
End: 2022-03-18

## 2022-03-18 DIAGNOSIS — Z01.818 PREOP TESTING: ICD-10-CM

## 2022-03-18 LAB — SARS-COV-2 RNA PNL SPEC NAA+PROBE: NOT DETECTED

## 2022-03-18 PROCEDURE — C9803 HOPD COVID-19 SPEC COLLECT: HCPCS

## 2022-03-18 PROCEDURE — U0004 COV-19 TEST NON-CDC HGH THRU: HCPCS

## 2022-03-23 ENCOUNTER — APPOINTMENT (OUTPATIENT)
Dept: ULTRASOUND IMAGING | Facility: HOSPITAL | Age: 77
End: 2022-03-23

## 2022-03-23 ENCOUNTER — HOSPITAL ENCOUNTER (OUTPATIENT)
Dept: ULTRASOUND IMAGING | Facility: HOSPITAL | Age: 77
Discharge: HOME | End: 2022-03-23
Admitting: NURSE PRACTITIONER

## 2022-03-23 DIAGNOSIS — E04.1 THYROID NODULE: ICD-10-CM

## 2022-03-23 PROCEDURE — 88173 CYTOPATH EVAL FNA REPORT: CPT | Performed by: NURSE PRACTITIONER

## 2022-03-23 PROCEDURE — 0 LIDOCAINE 1 % SOLUTION: Performed by: NURSE PRACTITIONER

## 2022-03-23 PROCEDURE — 76942 ECHO GUIDE FOR BIOPSY: CPT

## 2022-03-23 RX ORDER — LIDOCAINE HYDROCHLORIDE 10 MG/ML
10 INJECTION, SOLUTION INFILTRATION; PERINEURAL ONCE
Status: COMPLETED | OUTPATIENT
Start: 2022-03-23 | End: 2022-03-23

## 2022-03-23 RX ADMIN — LIDOCAINE HYDROCHLORIDE 10 ML: 10 INJECTION, SOLUTION INFILTRATION; PERINEURAL at 13:45

## 2022-03-25 LAB
CYTO UR: NORMAL
LAB AP CASE REPORT: NORMAL
LAB AP CLINICAL INFORMATION: NORMAL
LAB AP NON-GYN SPECIMEN ADEQUACY: NORMAL
PATH REPORT.FINAL DX SPEC: NORMAL
PATH REPORT.GROSS SPEC: NORMAL

## 2022-08-18 ENCOUNTER — TRANSCRIBE ORDERS (OUTPATIENT)
Dept: ADMINISTRATIVE | Facility: HOSPITAL | Age: 77
End: 2022-08-18

## 2022-08-18 ENCOUNTER — LAB (OUTPATIENT)
Dept: LAB | Facility: HOSPITAL | Age: 77
End: 2022-08-18

## 2022-08-18 DIAGNOSIS — N18.30 STAGE 3 CHRONIC KIDNEY DISEASE, UNSPECIFIED WHETHER STAGE 3A OR 3B CKD: ICD-10-CM

## 2022-08-18 DIAGNOSIS — E11.9 DIABETES MELLITUS WITHOUT COMPLICATION: ICD-10-CM

## 2022-08-18 DIAGNOSIS — N18.30 STAGE 3 CHRONIC KIDNEY DISEASE, UNSPECIFIED WHETHER STAGE 3A OR 3B CKD: Primary | ICD-10-CM

## 2022-08-18 DIAGNOSIS — I10 ESSENTIAL HYPERTENSION, MALIGNANT: ICD-10-CM

## 2022-08-18 LAB
ALBUMIN SERPL-MCNC: 4.2 G/DL (ref 3.5–5.2)
ALBUMIN/GLOB SERPL: 1.4 G/DL
ALP SERPL-CCNC: 75 U/L (ref 39–117)
ALT SERPL W P-5'-P-CCNC: 18 U/L (ref 1–33)
ANION GAP SERPL CALCULATED.3IONS-SCNC: 10.9 MMOL/L (ref 5–15)
AST SERPL-CCNC: 28 U/L (ref 1–32)
BACTERIA UR QL AUTO: ABNORMAL /HPF
BASOPHILS # BLD AUTO: 0.07 10*3/MM3 (ref 0–0.2)
BASOPHILS NFR BLD AUTO: 0.9 % (ref 0–1.5)
BILIRUB SERPL-MCNC: 0.3 MG/DL (ref 0–1.2)
BILIRUB UR QL STRIP: NEGATIVE
BUN SERPL-MCNC: 24 MG/DL (ref 8–23)
BUN/CREAT SERPL: 16 (ref 7–25)
CALCIUM SPEC-SCNC: 9.6 MG/DL (ref 8.6–10.5)
CHLORIDE SERPL-SCNC: 103 MMOL/L (ref 98–107)
CLARITY UR: ABNORMAL
CO2 SERPL-SCNC: 28.1 MMOL/L (ref 22–29)
COLOR UR: YELLOW
CREAT SERPL-MCNC: 1.5 MG/DL (ref 0.57–1)
DEPRECATED RDW RBC AUTO: 43.3 FL (ref 37–54)
EGFRCR SERPLBLD CKD-EPI 2021: 35.7 ML/MIN/1.73
EOSINOPHIL # BLD AUTO: 0.33 10*3/MM3 (ref 0–0.4)
EOSINOPHIL NFR BLD AUTO: 4.5 % (ref 0.3–6.2)
ERYTHROCYTE [DISTWIDTH] IN BLOOD BY AUTOMATED COUNT: 13.9 % (ref 12.3–15.4)
GLOBULIN UR ELPH-MCNC: 3 GM/DL
GLUCOSE SERPL-MCNC: 95 MG/DL (ref 65–99)
GLUCOSE UR STRIP-MCNC: NEGATIVE MG/DL
HBA1C MFR BLD: 8.4 % (ref 4.8–5.6)
HCT VFR BLD AUTO: 33.6 % (ref 34–46.6)
HGB BLD-MCNC: 11 G/DL (ref 12–15.9)
HGB UR QL STRIP.AUTO: NEGATIVE
HYALINE CASTS UR QL AUTO: ABNORMAL /LPF
IMM GRANULOCYTES # BLD AUTO: 0.02 10*3/MM3 (ref 0–0.05)
IMM GRANULOCYTES NFR BLD AUTO: 0.3 % (ref 0–0.5)
KETONES UR QL STRIP: NEGATIVE
LEUKOCYTE ESTERASE UR QL STRIP.AUTO: ABNORMAL
LYMPHOCYTES # BLD AUTO: 1.5 10*3/MM3 (ref 0.7–3.1)
LYMPHOCYTES NFR BLD AUTO: 20.2 % (ref 19.6–45.3)
MCH RBC QN AUTO: 27.8 PG (ref 26.6–33)
MCHC RBC AUTO-ENTMCNC: 32.7 G/DL (ref 31.5–35.7)
MCV RBC AUTO: 85.1 FL (ref 79–97)
MONOCYTES # BLD AUTO: 0.57 10*3/MM3 (ref 0.1–0.9)
MONOCYTES NFR BLD AUTO: 7.7 % (ref 5–12)
MUCOUS THREADS URNS QL MICRO: ABNORMAL /HPF
NEUTROPHILS NFR BLD AUTO: 4.92 10*3/MM3 (ref 1.7–7)
NEUTROPHILS NFR BLD AUTO: 66.4 % (ref 42.7–76)
NITRITE UR QL STRIP: NEGATIVE
NRBC BLD AUTO-RTO: 0 /100 WBC (ref 0–0.2)
PH UR STRIP.AUTO: 6 [PH] (ref 5–8)
PLATELET # BLD AUTO: 252 10*3/MM3 (ref 140–450)
PMV BLD AUTO: 9.2 FL (ref 6–12)
POTASSIUM SERPL-SCNC: 3.5 MMOL/L (ref 3.5–5.2)
PROT SERPL-MCNC: 7.2 G/DL (ref 6–8.5)
PROT UR QL STRIP: ABNORMAL
RBC # BLD AUTO: 3.95 10*6/MM3 (ref 3.77–5.28)
RBC # UR STRIP: ABNORMAL /HPF
REF LAB TEST METHOD: ABNORMAL
SODIUM SERPL-SCNC: 142 MMOL/L (ref 136–145)
SP GR UR STRIP: 1.02 (ref 1–1.03)
SQUAMOUS #/AREA URNS HPF: ABNORMAL /HPF
UROBILINOGEN UR QL STRIP: ABNORMAL
WBC # UR STRIP: ABNORMAL /HPF
WBC NRBC COR # BLD: 7.41 10*3/MM3 (ref 3.4–10.8)

## 2022-08-18 PROCEDURE — 85025 COMPLETE CBC W/AUTO DIFF WBC: CPT

## 2022-08-18 PROCEDURE — 80053 COMPREHEN METABOLIC PANEL: CPT

## 2022-08-18 PROCEDURE — 36415 COLL VENOUS BLD VENIPUNCTURE: CPT

## 2022-08-18 PROCEDURE — 81001 URINALYSIS AUTO W/SCOPE: CPT

## 2022-08-18 PROCEDURE — 83036 HEMOGLOBIN GLYCOSYLATED A1C: CPT

## 2022-09-27 ENCOUNTER — LAB (OUTPATIENT)
Dept: LAB | Facility: HOSPITAL | Age: 77
End: 2022-09-27

## 2022-09-27 ENCOUNTER — TRANSCRIBE ORDERS (OUTPATIENT)
Dept: ADMINISTRATIVE | Facility: HOSPITAL | Age: 77
End: 2022-09-27

## 2022-09-27 DIAGNOSIS — N18.30 STAGE 3 CHRONIC KIDNEY DISEASE, UNSPECIFIED WHETHER STAGE 3A OR 3B CKD: ICD-10-CM

## 2022-09-27 DIAGNOSIS — N18.30 STAGE 3 CHRONIC KIDNEY DISEASE, UNSPECIFIED WHETHER STAGE 3A OR 3B CKD: Primary | ICD-10-CM

## 2022-09-27 LAB
ALBUMIN UR-MCNC: 2.8 MG/DL
BACTERIA UR QL AUTO: ABNORMAL /HPF
BASOPHILS # BLD AUTO: 0.05 10*3/MM3 (ref 0–0.2)
BASOPHILS NFR BLD AUTO: 0.9 % (ref 0–1.5)
BILIRUB UR QL STRIP: NEGATIVE
CLARITY UR: CLEAR
COLOR UR: YELLOW
CREAT UR-MCNC: 19.2 MG/DL
CREAT UR-MCNC: 21.5 MG/DL
DEPRECATED RDW RBC AUTO: 45.8 FL (ref 37–54)
EOSINOPHIL # BLD AUTO: 0.23 10*3/MM3 (ref 0–0.4)
EOSINOPHIL NFR BLD AUTO: 3.9 % (ref 0.3–6.2)
ERYTHROCYTE [DISTWIDTH] IN BLOOD BY AUTOMATED COUNT: 14.5 % (ref 12.3–15.4)
GLUCOSE UR STRIP-MCNC: NEGATIVE MG/DL
HCT VFR BLD AUTO: 33.9 % (ref 34–46.6)
HGB BLD-MCNC: 11.2 G/DL (ref 12–15.9)
HGB UR QL STRIP.AUTO: NEGATIVE
HYALINE CASTS UR QL AUTO: ABNORMAL /LPF
IMM GRANULOCYTES # BLD AUTO: 0.02 10*3/MM3 (ref 0–0.05)
IMM GRANULOCYTES NFR BLD AUTO: 0.3 % (ref 0–0.5)
KETONES UR QL STRIP: NEGATIVE
LEUKOCYTE ESTERASE UR QL STRIP.AUTO: NEGATIVE
LYMPHOCYTES # BLD AUTO: 1.23 10*3/MM3 (ref 0.7–3.1)
LYMPHOCYTES NFR BLD AUTO: 21.1 % (ref 19.6–45.3)
MCH RBC QN AUTO: 28.6 PG (ref 26.6–33)
MCHC RBC AUTO-ENTMCNC: 33 G/DL (ref 31.5–35.7)
MCV RBC AUTO: 86.7 FL (ref 79–97)
MICROALBUMIN/CREAT UR: 145.8 MG/G
MONOCYTES # BLD AUTO: 0.47 10*3/MM3 (ref 0.1–0.9)
MONOCYTES NFR BLD AUTO: 8 % (ref 5–12)
NEUTROPHILS NFR BLD AUTO: 3.84 10*3/MM3 (ref 1.7–7)
NEUTROPHILS NFR BLD AUTO: 65.8 % (ref 42.7–76)
NITRITE UR QL STRIP: NEGATIVE
NRBC BLD AUTO-RTO: 0 /100 WBC (ref 0–0.2)
PH UR STRIP.AUTO: 6.5 [PH] (ref 5–8)
PLATELET # BLD AUTO: 250 10*3/MM3 (ref 140–450)
PMV BLD AUTO: 9.9 FL (ref 6–12)
PROT ?TM UR-MCNC: 8.6 MG/DL
PROT UR QL STRIP: NEGATIVE
PROT/CREAT UR: 0.4 MG/G{CREAT}
RBC # BLD AUTO: 3.91 10*6/MM3 (ref 3.77–5.28)
RBC # UR STRIP: ABNORMAL /HPF
REF LAB TEST METHOD: ABNORMAL
SP GR UR STRIP: 1.01 (ref 1–1.03)
SQUAMOUS #/AREA URNS HPF: ABNORMAL /HPF
UROBILINOGEN UR QL STRIP: NORMAL
WBC # UR STRIP: ABNORMAL /HPF
WBC NRBC COR # BLD: 5.84 10*3/MM3 (ref 3.4–10.8)

## 2022-09-27 PROCEDURE — 82043 UR ALBUMIN QUANTITATIVE: CPT

## 2022-09-27 PROCEDURE — 84156 ASSAY OF PROTEIN URINE: CPT

## 2022-09-27 PROCEDURE — 81001 URINALYSIS AUTO W/SCOPE: CPT

## 2022-09-27 PROCEDURE — 85025 COMPLETE CBC W/AUTO DIFF WBC: CPT

## 2022-09-27 PROCEDURE — 80053 COMPREHEN METABOLIC PANEL: CPT

## 2022-09-27 PROCEDURE — 82570 ASSAY OF URINE CREATININE: CPT

## 2022-09-27 PROCEDURE — 36415 COLL VENOUS BLD VENIPUNCTURE: CPT

## 2022-09-28 LAB
ALBUMIN SERPL-MCNC: 4.2 G/DL (ref 3.5–5.2)
ALBUMIN/GLOB SERPL: 1.5 G/DL
ALP SERPL-CCNC: 68 U/L (ref 39–117)
ALT SERPL W P-5'-P-CCNC: 19 U/L (ref 1–33)
ANION GAP SERPL CALCULATED.3IONS-SCNC: 7.9 MMOL/L (ref 5–15)
AST SERPL-CCNC: 18 U/L (ref 1–32)
BILIRUB SERPL-MCNC: 0.4 MG/DL (ref 0–1.2)
BUN SERPL-MCNC: 19 MG/DL (ref 8–23)
BUN/CREAT SERPL: 19.8 (ref 7–25)
CALCIUM SPEC-SCNC: 9.3 MG/DL (ref 8.6–10.5)
CHLORIDE SERPL-SCNC: 102 MMOL/L (ref 98–107)
CO2 SERPL-SCNC: 30.1 MMOL/L (ref 22–29)
CREAT SERPL-MCNC: 0.96 MG/DL (ref 0.57–1)
EGFRCR SERPLBLD CKD-EPI 2021: 60.7 ML/MIN/1.73
GLOBULIN UR ELPH-MCNC: 2.8 GM/DL
GLUCOSE SERPL-MCNC: 180 MG/DL (ref 65–99)
POTASSIUM SERPL-SCNC: 4.3 MMOL/L (ref 3.5–5.2)
PROT SERPL-MCNC: 7 G/DL (ref 6–8.5)
SODIUM SERPL-SCNC: 140 MMOL/L (ref 136–145)

## 2022-09-29 ENCOUNTER — TRANSCRIBE ORDERS (OUTPATIENT)
Dept: ADMINISTRATIVE | Facility: HOSPITAL | Age: 77
End: 2022-09-29

## 2022-09-29 DIAGNOSIS — M54.50 LUMBAR PAIN: Primary | ICD-10-CM

## 2022-10-18 ENCOUNTER — HOSPITAL ENCOUNTER (OUTPATIENT)
Dept: MRI IMAGING | Facility: HOSPITAL | Age: 77
Discharge: HOME | End: 2022-10-18
Admitting: PEDIATRICS

## 2022-10-18 DIAGNOSIS — M54.50 LUMBAR PAIN: ICD-10-CM

## 2022-10-18 PROCEDURE — 72148 MRI LUMBAR SPINE W/O DYE: CPT

## 2022-11-02 ENCOUNTER — TRANSCRIBE ORDERS (OUTPATIENT)
Dept: ADMINISTRATIVE | Facility: HOSPITAL | Age: 77
End: 2022-11-02

## 2022-11-02 DIAGNOSIS — R09.89 LEFT CAROTID BRUIT: Primary | ICD-10-CM

## 2022-11-17 ENCOUNTER — APPOINTMENT (OUTPATIENT)
Dept: CARDIOLOGY | Facility: HOSPITAL | Age: 77
End: 2022-11-17

## 2022-12-01 ENCOUNTER — HOSPITAL ENCOUNTER (OUTPATIENT)
Dept: CARDIOLOGY | Facility: HOSPITAL | Age: 77
Discharge: HOME | End: 2022-12-01
Admitting: NURSE PRACTITIONER

## 2022-12-01 DIAGNOSIS — R09.89 LEFT CAROTID BRUIT: ICD-10-CM

## 2022-12-01 LAB
BH CV XLRA MEAS LEFT CAROTID BULB EDV: 11 CM/SEC
BH CV XLRA MEAS LEFT CAROTID BULB PSV: 46 CM/SEC
BH CV XLRA MEAS LEFT DIST CCA EDV: 13 CM/SEC
BH CV XLRA MEAS LEFT DIST CCA PSV: 60 CM/SEC
BH CV XLRA MEAS LEFT DIST ICA EDV: 14 CM/SEC
BH CV XLRA MEAS LEFT DIST ICA PSV: 40 CM/SEC
BH CV XLRA MEAS LEFT MID ICA EDV: 15 CM/SEC
BH CV XLRA MEAS LEFT MID ICA PSV: 53 CM/SEC
BH CV XLRA MEAS LEFT PROX CCA EDV: 14 CM/SEC
BH CV XLRA MEAS LEFT PROX CCA PSV: 84 CM/SEC
BH CV XLRA MEAS LEFT PROX ECA EDV: 10 CM/SEC
BH CV XLRA MEAS LEFT PROX ECA PSV: 62 CM/SEC
BH CV XLRA MEAS LEFT PROX ICA EDV: 12 CM/SEC
BH CV XLRA MEAS LEFT PROX ICA PSV: 41 CM/SEC
BH CV XLRA MEAS LEFT VERTEBRAL A EDV: 13 CM/SEC
BH CV XLRA MEAS LEFT VERTEBRAL A PSV: 57 CM/SEC
BH CV XLRA MEAS RIGHT CAROTID BULB EDV: 9 CM/SEC
BH CV XLRA MEAS RIGHT CAROTID BULB PSV: 50 CM/SEC
BH CV XLRA MEAS RIGHT DIST CCA EDV: 11 CM/SEC
BH CV XLRA MEAS RIGHT DIST CCA PSV: 54 CM/SEC
BH CV XLRA MEAS RIGHT DIST ICA EDV: 11 CM/SEC
BH CV XLRA MEAS RIGHT DIST ICA PSV: 49 CM/SEC
BH CV XLRA MEAS RIGHT MID ICA EDV: 17 CM/SEC
BH CV XLRA MEAS RIGHT MID ICA PSV: 55 CM/SEC
BH CV XLRA MEAS RIGHT PROX CCA EDV: 15 CM/SEC
BH CV XLRA MEAS RIGHT PROX CCA PSV: 78 CM/SEC
BH CV XLRA MEAS RIGHT PROX ECA EDV: 13 CM/SEC
BH CV XLRA MEAS RIGHT PROX ECA PSV: 73 CM/SEC
BH CV XLRA MEAS RIGHT PROX ICA EDV: 12 CM/SEC
BH CV XLRA MEAS RIGHT PROX ICA PSV: 43 CM/SEC
BH CV XLRA MEAS RIGHT VERTEBRAL A EDV: 13 CM/SEC
BH CV XLRA MEAS RIGHT VERTEBRAL A PSV: 58 CM/SEC
LEFT ARM BP: NORMAL MMHG
MAXIMAL PREDICTED HEART RATE: 142 BPM
RIGHT ARM BP: NORMAL MMHG
STRESS TARGET HR: 121 BPM

## 2022-12-01 PROCEDURE — 93880 EXTRACRANIAL BILAT STUDY: CPT

## 2022-12-01 PROCEDURE — 93880 EXTRACRANIAL BILAT STUDY: CPT | Performed by: SURGERY

## 2022-12-05 ENCOUNTER — HOSPITAL ENCOUNTER (OUTPATIENT)
Dept: ULTRASOUND IMAGING | Facility: HOSPITAL | Age: 77
Discharge: HOME | End: 2022-12-05
Admitting: OTOLARYNGOLOGY

## 2022-12-05 DIAGNOSIS — E04.1 THYROID NODULE: ICD-10-CM

## 2022-12-05 PROCEDURE — 76536 US EXAM OF HEAD AND NECK: CPT

## 2022-12-21 RX ORDER — SEMAGLUTIDE 1.34 MG/ML
INJECTION, SOLUTION SUBCUTANEOUS
COMMUNITY
Start: 2022-10-24

## 2022-12-21 RX ORDER — LOSARTAN POTASSIUM 100 MG/1
100 TABLET ORAL DAILY
COMMUNITY
Start: 2022-11-02

## 2022-12-21 RX ORDER — BUSPIRONE HYDROCHLORIDE 10 MG/1
10 TABLET ORAL
COMMUNITY
Start: 2022-11-02

## 2022-12-21 RX ORDER — AMLODIPINE BESYLATE 10 MG/1
TABLET ORAL
COMMUNITY
Start: 2022-10-07

## 2022-12-21 RX ORDER — BACLOFEN 5 MG/1
TABLET ORAL
COMMUNITY
Start: 2022-09-22

## 2022-12-21 RX ORDER — IBUPROFEN 600 MG/1
600 TABLET ORAL
COMMUNITY

## 2022-12-21 RX ORDER — HUMAN INSULIN 100 [IU]/ML
INJECTION, SOLUTION SUBCUTANEOUS
COMMUNITY

## 2022-12-22 ENCOUNTER — OFFICE VISIT (OUTPATIENT)
Dept: OTOLARYNGOLOGY | Facility: CLINIC | Age: 77
End: 2022-12-22

## 2022-12-22 VITALS — BODY MASS INDEX: 37.73 KG/M2 | HEIGHT: 62 IN | WEIGHT: 205 LBS | TEMPERATURE: 97.5 F

## 2022-12-22 DIAGNOSIS — K11.8 PAROTID MASS: ICD-10-CM

## 2022-12-22 DIAGNOSIS — E04.1 THYROID NODULE: Primary | ICD-10-CM

## 2022-12-22 PROCEDURE — 99213 OFFICE O/P EST LOW 20 MIN: CPT | Performed by: OTOLARYNGOLOGY

## 2022-12-22 NOTE — PROGRESS NOTES
Patient Name: Gemma Benjamin   Visit Date: 12/22/2022   Patient ID: [id number]  Provider: Wali Fowler MD    Sex: female  Location: [facility] [place] [place] [facility]   YOB: 1945  Location Address: [address] [place] [address], [address] [place] [address],?[place]?[id number]    Primary Care Provider Alice Lino APRN  Location Phone: [phone]    Referring Provider: No ref. provider found        Chief Complaint  1 year follow up w/ US results    History of Present Illness  Gemma Benjamin is a 77 y.o. female with past medical history significant for hypertension who returns today for follow-up of thyroid nodules.  She was originally seen on 1/27/2021 after having seen Dr. Seay who is an endocrine surgeon in [place]. She denied any issues with dysphagia, hoarseness, or stridor. She denies any family history of thyroid cancer but does report a personal history of radiation exposure while undergoing radiation therapy for breast cancer.  Thyroid ultrasound on 6/2/2020 revealed a right 1.6 cm hypoechoic nodule, a right 2.6 cm isoechoic nodule, a right 0.9 cm hypoechoic nodule, and 3 left-sided nodules with the largest measuring 1.9 cm. Subsequent ultrasound-guided FNA of the 2 largest right-sided nodules on 6/8/2020 was found to be consistent with colloid nodules ([place] category 2).  Thyroid ultrasound on 12/2/2020 revealed an increase in the hypoechoic right 1.6 cm nodule to 2.1 cm.  Examination that day was unremarkable and we elected to pursue continued observation. Thyroid ultrasound on 12/6/2021 revealed a left 2.1 cm isoechoic nodule, left 0.6 cm nodule, right inferior 2.5 cm isoechoic nodule, right 1.4 cm hypoechoic nodule, right 1.4 cm heterogeneous nodule, right 1.9 cm nodule, and right 2.4 cm nodule.  The right lobe measures 7.8 cm in greatest dimension. CTA of her neck for evaluation of carotid stenosis and a 0.8 cm right parotid mass and 1 cm left parotid mass are noted.    She returns today for follow-up  having last been seen on 2/24/2022 at which time she was already scheduled to undergo an ultrasound-guided FNA.  FNA of the right inferior thyroid nodule which measured 2.5 cm was consistent with a colloid nodule with cystic degeneration ([place] category 2).  Thyroid ultrasound on 12/5/2022 revealed a right 2.6 cm nodule which was stable, a stable right 1.1 cm isoechoic nodule, a right heterogeneous 1.3 cm nodule, a right inferior 2.8 cm nodule which is slightly hypoechoic, a left 0.8 cm hypoechoic nodule, a left 1.1 cm hypoechoic nodule, a left 1 cm hyperechoic nodule, and a left isoechoic 2 cm nodule.  She did not end up undergoing biopsy of the parotid masses. She tells that her left parotid has been tender. She denies any trismus or facial weakness.     Past Medical History:   Diagnosis Date   • Acid reflux    • Arthritis    • Asthma    • Cancer (HCC)    • Diabetes (HCC)    • GERD (gastroesophageal reflux disease)    • Hyperlipemia    • Hyperlipidemia    • Parotid mass    • Seasonal allergies    • Sleep apnea    • Thyroid disorder    • Thyroid nodule     nontoxic multinodular goiter   • Type 2 diabetes mellitus (HCC)        Past Surgical History:   Procedure Laterality Date   • BREAST LUMPECTOMY Left 2009   • US GUIDED FINE NEEDLE ASPIRATION  6/8/2020   • US GUIDED FINE NEEDLE ASPIRATION  6/8/2020         Current Outpatient Medications:   •  Accu-Chek Dalia Plus test strip, , Disp: , Rfl:   •  amLODIPine (NORVASC) 10 MG tablet, , Disp: , Rfl:   •  amLODIPine (NORVASC) 5 MG tablet, , Disp: , Rfl:   •  Baclofen 5 MG tablet, , Disp: , Rfl:   •  busPIRone (BUSPAR) 10 MG tablet, Take 10 mg by mouth., Disp: , Rfl:   •  Cholecalciferol 50 MCG (2000 UT) tablet, Vitamin D3 50 mcg (2,000 unit) oral tablet take 1 tablet by oral route 2 times a day   Active, Disp: , Rfl:   •  DULoxetine (CYMBALTA) 60 MG capsule, Take 60 mg by mouth Daily., Disp: , Rfl:   •  ibuprofen (ADVIL,MOTRIN) 600 MG tablet, Take 600 mg by mouth.,  "Disp: , Rfl:   •  indapamide (LOZOL) 2.5 MG tablet, , Disp: , Rfl:   •  insulin aspart (novoLOG) 100 UNIT/ML injection, INJECT 100 UNITS DAILY VIA PUMP, Disp: , Rfl:   •  Insulin Pen Needle (Droplet Pen Needles) 32G X 6 MM misc, USE FOR INJECTION DAILY., Disp: , Rfl:   •  insulin regular (NovoLIN R) 100 UNIT/ML injection, Novolin R Regular U-100 Insulin 100 unit/mL injection solution, Disp: , Rfl:   •  losartan (COZAAR) 100 MG tablet, Take 100 mg by mouth Daily., Disp: , Rfl:   •  Ozempic, 0.25 or 0.5 MG/DOSE, 2 MG/1.5ML solution pen-injector, , Disp: , Rfl:   •  rosuvastatin (CRESTOR) 40 MG tablet, , Disp: , Rfl:      Allergies   Allergen Reactions   • Statins Myalgia   • Sulfa Antibiotics Headache     Severe headache         Social History     Tobacco Use   • Smoking status: Never   • Smokeless tobacco: Never   Substance Use Topics   • Alcohol use: Yes     Comment: rarely drinks, has been drinking for 31 or more years   • Drug use: Never        Objective     Vital Signs:   Temp 97.5 °F (36.4 °C) (Temporal)   Ht 157.5 cm (62\")   Wt 93 kg (205 lb)   BMI 37.49 kg/m²       Physical Exam    General: Well developed, well nourished patient of stated age in no acute distress. Voice is strong and clear.   Head: Normocephalic and atraumatic.  Face: No lesions but some vitiligo changes to her forehead.  Left-sided parotid mass is faintly palpable and somewhat larger than last year but is nontender and mobile to palpation.  Bilateral submandibular glands are unremarkable.  Stensen's and Warthin's ducts are productive of clear saliva bilaterally.  House-Brackmann I/VI     bilaterally.   muscles and temporomandibular joint nontender to palpation.  No TMJ crepitus.  Eyes: PERRLA, sclerae anicteric, no conjunctival injection. Extra ocular movements are intact and full. No nystagmus.   Ears: Auricles are normal in appearance. Bilateral external auditory canals are unremarkable. Bilateral tympanic membranes are clear " and without effusion. Hearing normal to conversational voice.   Nose: External nose is normal in appearance. Bilateral nares are patent with normal appearing mucosa. Septum midline. Turbinates are unremarkable. No lesions.   Oral Cavity: Lips are normal in appearance. Oral mucosa is unremarkable. Gingiva is unremarkable. Partial dentition for age. Tongue is unremarkable with good movement. Hard palate is unremarkable.   Oropharynx: Soft palate is unremarkable with full movement. Uvula is unremarkable. Bilateral tonsils are unremarkable. Posterior oropharynx is unremarkable.    Larynx and hypopharynx: Deferred secondary to gag reflex.  Neck: Supple.  No mass.  Nontender to palpation.  Trachea midline. Thyroid normal size and without nodules to palpation.   Lymphatic: No lymphadenopathy upon palpation.   Psychiatric: Appropriate affect, cooperative   Neurologic: Oriented x 3, strength symmetric in all extremities, Cranial Nerves II-XII are grossly intact to confrontation. Mild paresthesia in V2 on the left.   Skin: Warm and dry. No rashes.    Procedures           Result Review :               Assessment and Plan    Diagnoses and all orders for this visit:    1. Thyroid nodule (Primary)  -     US Thyroid; Future    2. Parotid mass  -     US Guided Salivary Gland Biopsy Left; Future    Other orders  -     Non-gynecologic Cytology; Standing      Impressions and findings were discussed.  Currently, we reviewed and discussed the results of her latest right-sided thyroid nodule FNA which was consistent with a colloid nodule.  We also reviewed and discussed the images from her recent thyroid ultrasound for which we will follow-up the nodules with a repeat ultrasound in 1 year.  In regards to her left-sided parotid mass this does seem to be larger on examination today and she does report some discomfort in that area.  I have recommended that she undergo an ultrasound-guided FNA for further evaluation.  She was given ample  time to ask questions, all of which were answered to her satisfaction.    Follow Up   No follow-ups on file.  Patient was given instructions and counseling regarding her condition or for health maintenance advice. Please see specific information pulled into the AVS if appropriate.

## 2023-01-23 ENCOUNTER — HOSPITAL ENCOUNTER (OUTPATIENT)
Dept: ULTRASOUND IMAGING | Facility: HOSPITAL | Age: 78
Discharge: HOME | End: 2023-01-23
Admitting: OTOLARYNGOLOGY
Payer: MEDICARE

## 2023-01-23 DIAGNOSIS — K11.8 PAROTID MASS: ICD-10-CM

## 2023-01-23 PROCEDURE — 88173 CYTOPATH EVAL FNA REPORT: CPT | Performed by: OTOLARYNGOLOGY

## 2023-01-23 PROCEDURE — 0 LIDOCAINE 1 % SOLUTION: Performed by: OTOLARYNGOLOGY

## 2023-01-23 PROCEDURE — 76942 ECHO GUIDE FOR BIOPSY: CPT

## 2023-01-23 PROCEDURE — 88305 TISSUE EXAM BY PATHOLOGIST: CPT | Performed by: OTOLARYNGOLOGY

## 2023-01-23 RX ORDER — LIDOCAINE HYDROCHLORIDE 10 MG/ML
10 INJECTION, SOLUTION INFILTRATION; PERINEURAL ONCE
Status: COMPLETED | OUTPATIENT
Start: 2023-01-23 | End: 2023-01-23

## 2023-01-23 RX ADMIN — LIDOCAINE HYDROCHLORIDE 10 ML: 10 INJECTION, SOLUTION INFILTRATION; PERINEURAL at 10:45

## 2023-01-25 LAB
CYTO UR: NORMAL
LAB AP CASE REPORT: NORMAL
LAB AP CLINICAL INFORMATION: NORMAL
LAB AP DIAGNOSIS COMMENT: NORMAL
LAB AP NON-GYN SPECIMEN ADEQUACY: NORMAL
PATH REPORT.FINAL DX SPEC: NORMAL
PATH REPORT.GROSS SPEC: NORMAL

## 2023-02-01 DIAGNOSIS — K11.8 PAROTID MASS: Primary | ICD-10-CM

## 2023-04-11 ENCOUNTER — TRANSCRIBE ORDERS (OUTPATIENT)
Dept: ADMINISTRATIVE | Facility: HOSPITAL | Age: 78
End: 2023-04-11
Payer: MEDICARE

## 2023-04-11 DIAGNOSIS — E04.1 THYROID NODULE: Primary | ICD-10-CM

## 2023-05-19 ENCOUNTER — TRANSCRIBE ORDERS (OUTPATIENT)
Dept: ADMINISTRATIVE | Facility: HOSPITAL | Age: 78
End: 2023-05-19
Payer: MEDICARE

## 2023-05-19 DIAGNOSIS — Z78.0 POST-MENOPAUSAL: Primary | ICD-10-CM

## 2023-05-19 DIAGNOSIS — Z12.31 VISIT FOR SCREENING MAMMOGRAM: ICD-10-CM

## 2023-08-31 ENCOUNTER — HOSPITAL ENCOUNTER (OUTPATIENT)
Dept: BONE DENSITY | Facility: HOSPITAL | Age: 78
Discharge: HOME | End: 2023-08-31
Payer: MEDICARE

## 2023-08-31 ENCOUNTER — HOSPITAL ENCOUNTER (OUTPATIENT)
Dept: MAMMOGRAPHY | Facility: HOSPITAL | Age: 78
Discharge: HOME | End: 2023-08-31
Payer: MEDICARE

## 2023-08-31 DIAGNOSIS — Z12.31 VISIT FOR SCREENING MAMMOGRAM: ICD-10-CM

## 2023-08-31 DIAGNOSIS — Z78.0 POST-MENOPAUSAL: ICD-10-CM

## 2023-08-31 PROCEDURE — 77067 SCR MAMMO BI INCL CAD: CPT

## 2023-08-31 PROCEDURE — 77080 DXA BONE DENSITY AXIAL: CPT

## 2023-08-31 PROCEDURE — 77063 BREAST TOMOSYNTHESIS BI: CPT

## 2023-09-01 ENCOUNTER — HOSPITAL ENCOUNTER (EMERGENCY)
Facility: HOSPITAL | Age: 78
Discharge: HOME | End: 2023-09-01
Attending: EMERGENCY MEDICINE
Payer: MEDICARE

## 2023-09-01 ENCOUNTER — APPOINTMENT (OUTPATIENT)
Dept: GENERAL RADIOLOGY | Facility: HOSPITAL | Age: 78
End: 2023-09-01
Payer: MEDICARE

## 2023-09-01 VITALS
HEART RATE: 71 BPM | HEIGHT: 63 IN | SYSTOLIC BLOOD PRESSURE: 150 MMHG | BODY MASS INDEX: 32.23 KG/M2 | OXYGEN SATURATION: 99 % | TEMPERATURE: 98.6 F | WEIGHT: 181.88 LBS | DIASTOLIC BLOOD PRESSURE: 80 MMHG | RESPIRATION RATE: 18 BRPM

## 2023-09-01 DIAGNOSIS — S32.010A CLOSED COMPRESSION FRACTURE OF L1 LUMBAR VERTEBRA, INITIAL ENCOUNTER: Primary | ICD-10-CM

## 2023-09-01 PROCEDURE — L0464 TLSO 4MOD SACRO-SCAP PRE: HCPCS | Performed by: PHYSICAL THERAPIST

## 2023-09-01 PROCEDURE — 97760 ORTHOTIC MGMT&TRAING 1ST ENC: CPT | Performed by: PHYSICAL THERAPIST

## 2023-09-01 PROCEDURE — 97161 PT EVAL LOW COMPLEX 20 MIN: CPT | Performed by: PHYSICAL THERAPIST

## 2023-09-01 PROCEDURE — 96372 THER/PROPH/DIAG INJ SC/IM: CPT

## 2023-09-01 PROCEDURE — 25010000002 HYDROMORPHONE 1 MG/ML SOLUTION: Performed by: EMERGENCY MEDICINE

## 2023-09-01 PROCEDURE — 99283 EMERGENCY DEPT VISIT LOW MDM: CPT

## 2023-09-01 PROCEDURE — 72100 X-RAY EXAM L-S SPINE 2/3 VWS: CPT

## 2023-09-01 RX ORDER — OXYCODONE HYDROCHLORIDE AND ACETAMINOPHEN 5; 325 MG/1; MG/1
1 TABLET ORAL EVERY 6 HOURS PRN
Qty: 12 TABLET | Refills: 0 | Status: SHIPPED | OUTPATIENT
Start: 2023-09-01

## 2023-09-01 RX ADMIN — HYDROMORPHONE HYDROCHLORIDE 1 MG: 1 INJECTION, SOLUTION INTRAMUSCULAR; INTRAVENOUS; SUBCUTANEOUS at 07:43

## 2023-09-01 NOTE — ED PROVIDER NOTES
Time: 7:17 AM EDT  Date of encounter:  9/1/2023  Independent Historian/Clinical History and Information was obtained by:   Patient    History is limited by: N/A    Chief Complaint: Low back pain      History of Present Illness:  Patient is a 78 y.o. year old female who presents to the emergency department for evaluation of low back pain.  Patient has history of spinal stenosis and is currently in pain management.  5 days ago she fell which caused increased back pain.  She saw her pain management physicians the next day and received her normal injections.  Patient did not seek any other medical care.  Patient states the pain has increased.  Patient is currently on tramadol but it is not helping.    HPI    Patient Care Team  Primary Care Provider: Alice Lino APRN    Past Medical History:     Allergies   Allergen Reactions    Statins Myalgia    Sulfa Antibiotics Headache     Severe headache       Past Medical History:   Diagnosis Date    Acid reflux     Arthritis     Asthma     Cancer     Diabetes     GERD (gastroesophageal reflux disease)     Hyperlipemia     Hyperlipidemia     Parotid mass     Seasonal allergies     Sleep apnea     Thyroid disorder     Thyroid nodule     nontoxic multinodular goiter    Type 2 diabetes mellitus      Past Surgical History:   Procedure Laterality Date    BREAST LUMPECTOMY Left 2009    US GUIDED FINE NEEDLE ASPIRATION  6/8/2020    US GUIDED FINE NEEDLE ASPIRATION  6/8/2020     Family History   Problem Relation Age of Onset    Stroke Mother     Heart disease Mother     Diabetes Mother     Osteoporosis Mother     Cancer Father     Diabetes Father     Pancreatic cancer Father     Cancer Sister     Diabetes Sister     Lung cancer Sister     Osteoporosis Brother     Arthritis Brother     Heart disease Maternal Grandmother     Heart disease Paternal Grandmother     Heart disease Daughter        Home Medications:  Prior to Admission medications    Medication Sig Start Date End Date  "Taking? Authorizing Provider   Accu-Chek Dalia Plus test strip  11/29/21   Girish Ricketts MD   amLODIPine (NORVASC) 10 MG tablet  10/7/22   Girish Ricketts MD   amLODIPine (NORVASC) 5 MG tablet  11/19/21   Giirsh Ricketts MD   Baclofen 5 MG tablet  9/22/22   Girish Ricketts MD   busPIRone (BUSPAR) 10 MG tablet Take 10 mg by mouth. 11/2/22   Girish Ricketts MD   Cholecalciferol 50 MCG (2000 UT) tablet Vitamin D3 50 mcg (2,000 unit) oral tablet take 1 tablet by oral route 2 times a day   Active    Girish Ricketts MD   DULoxetine (CYMBALTA) 60 MG capsule Take 60 mg by mouth Daily. 12/28/21 12/28/22  Girish Ricketts MD   ibuprofen (ADVIL,MOTRIN) 600 MG tablet Take 600 mg by mouth.    Girish Ricketts MD   indapamide (LOZOL) 2.5 MG tablet  5/21/21   Girish Ricketts MD   insulin aspart (novoLOG) 100 UNIT/ML injection INJECT 100 UNITS DAILY VIA PUMP 5/2/21   Girish Ricketts MD   Insulin Pen Needle (Droplet Pen Needles) 32G X 6 MM misc USE FOR INJECTION DAILY. 4/22/21   Girsih Ricketts MD   insulin regular (NovoLIN R) 100 UNIT/ML injection Novolin R Regular U-100 Insulin 100 unit/mL injection solution    Girish Ricketts MD   losartan (COZAAR) 100 MG tablet Take 100 mg by mouth Daily. 11/2/22   Girish Ricketts MD   Ozempic, 0.25 or 0.5 MG/DOSE, 2 MG/1.5ML solution pen-injector  10/24/22   Girish Ricketts MD   rosuvastatin (CRESTOR) 40 MG tablet  11/24/21   Girish Ricketts MD        Social History:   Social History     Tobacco Use    Smoking status: Never    Smokeless tobacco: Never   Substance Use Topics    Alcohol use: Yes     Comment: rarely drinks, has been drinking for 31 or more years    Drug use: Never         Review of Systems:  Review of Systems   Musculoskeletal:  Positive for back pain.      Physical Exam:  /80   Pulse 71   Temp 98.6 °F (37 °C) (Oral)   Resp 18   Ht 158.8 cm (62.5\")   Wt 82.5 kg (181 lb 14.1 oz)   " SpO2 99%   BMI 32.74 kg/m²     Physical Exam  Vitals and nursing note reviewed.   Constitutional:       General: She is not in acute distress.     Appearance: Normal appearance.   HENT:      Head: Normocephalic and atraumatic.   Cardiovascular:      Rate and Rhythm: Normal rate and regular rhythm.   Pulmonary:      Effort: Pulmonary effort is normal. No respiratory distress.      Breath sounds: Normal breath sounds.   Abdominal:      Palpations: Abdomen is soft.      Tenderness: There is no abdominal tenderness.   Musculoskeletal:         General: Normal range of motion.      Cervical back: Normal range of motion and neck supple.      Comments: Patient has no tenderness over lumbar spine   Skin:     General: Skin is warm and dry.   Neurological:      Mental Status: She is alert and oriented to person, place, and time.                Procedures:  Procedures      Medical Decision Making:      Comorbidities that affect care:    Cancer, Diabetes    External Notes reviewed:    None      The following orders were placed and all results were independently analyzed by me:  Orders Placed This Encounter   Procedures    XR Spine Lumbar AP & Lateral    Obtain & Apply The Following- Back/Torso; Back brace (hard)    PT Plan of Care Cert / Re-Cert       Medications Given in the Emergency Department:  Medications   HYDROmorphone (DILAUDID) injection 1 mg (1 mg Intramuscular Given 9/1/23 0743)        ED Course:         Labs:    Lab Results (last 24 hours)       ** No results found for the last 24 hours. **             Imaging:    No Radiology Exams Resulted Within Past 24 Hours      Differential Diagnosis and Discussion:    Trauma:  Differential diagnosis considered but not limited to were subarachnoid hemorrhage, intracranial bleeding, pneumothorax, cardiac contusion, lung contusion, intra-abdominal bleeding, and compartment syndrome of any extremity or other significant traumatic pathology    All X-rays impressions were  independently interpreted by me.    MDM  Patient shows an L1 compression fracture on x-ray and a TLSO brace was applied.  Patient's pain was improved with pain medication she will follow-up outpatient with Dr. Dominguez, neurosurgery.        Patient Care Considerations:    LABS: I considered ordering labs, however patient had a mechanical fall      Consultants/Shared Management Plan:    I contacted the patient's pain management office and talked with Dr. Felix Dominguez's assistant who conferred with him.  They approved for me to write the patient a prescription for Percocet.  And they will see the patient in the office in follow-up this week.    Social Determinants of Health:    Patient has presented with family members who are responsible, reliable and will ensure follow up care.      Disposition and Care Coordination:    Discharged: The patient is suitable and stable for discharge with no need for consideration of observation or admission.    I have explained discharge medications and the need for follow up with the patient/caretakers. This was also printed in the discharge instructions. Patient was discharged with the following medications and follow up:      Medication List        New Prescriptions      oxyCODONE-acetaminophen 5-325 MG per tablet  Commonly known as: PERCOCET  Take 1 tablet by mouth Every 6 (Six) Hours As Needed for Severe Pain.               Where to Get Your Medications        These medications were sent to [facility] [place]  [address] [place] [address]      Hours: Mon-Fri 9:00AM-7:30PM Saturday 9:00AM-2:00PM Phone: [phone]   oxyCODONE-acetaminophen 5-325 MG per tablet      No follow-up provider specified.     Final diagnoses:   Closed compression fracture of L1 lumbar vertebra, initial encounter        ED Disposition       ED Disposition   Discharge    Condition   Stable    Comment   --               This medical record created using voice recognition  software.             Fredy Blanton DO  09/03/23 0939

## 2023-09-01 NOTE — THERAPY EVALUATION
Patient Name: Gemma Benjamin  : 1945    MRN: [id number]                              Today's Date: 2023       Admit Date: 2023    Visit Dx:     ICD-10-CM ICD-9-CM   1. Closed compression fracture of L1 lumbar vertebra, initial encounter  S32.010A 805.4     Patient Active Problem List   Diagnosis    Left wrist pain    Aftercare following surgery of the musculoskeletal system     Past Medical History:   Diagnosis Date    Acid reflux     Arthritis     Asthma     Cancer     Diabetes     GERD (gastroesophageal reflux disease)     Hyperlipemia     Hyperlipidemia     Parotid mass     Seasonal allergies     Sleep apnea     Thyroid disorder     Thyroid nodule     nontoxic multinodular goiter    Type 2 diabetes mellitus      Past Surgical History:   Procedure Laterality Date    BREAST LUMPECTOMY Left     US GUIDED FINE NEEDLE ASPIRATION  2020    US GUIDED FINE NEEDLE ASPIRATION  2020      General Information       Row Name 23 0936          Physical Therapy Time and Intention    Document Type evaluation  -LR     Mode of Treatment individual therapy  -LR       Row Name 23 0936          General Information    Patient Profile Reviewed yes  -LR     Prior Level of Function independent:  -LR               User Key  (r) = Recorded By, (t) = Taken By, (c) = Cosigned By      Initials Name Provider Type    LR Domi Barton, PT Physical Therapist                  History: Patient is a 78-year-old female who presents to the emergency department for evaluation of low back pain following a fall 5 days ago.  Patient reports pain is a 10/10.  Patient reports a history of chronic low back pain and is currently in pain management.    Objective:      Orthotic Management/Training:  Location: Spine  Location Modifier: [L1]  Type: [TLSO with sternal not]  Type Modifier: [XL]  Functional Improvement: Increased ADL function, improved positioning, decreased pain, improved joint/muscle support  Fitting/Training  Provided: Sizing of orthotic, instructions in use, modification of orthotic    Orthotic Fabrication/Fit:  Location: Spine  Kind of Orthotic Needed: [TLSO rigid 4 piece with sternal notch]  Therapist's Intervention: Device custom fitted  Custom Fitting Provided: Measured patient's circumference above/around umbilicus [51 inches , XL], selected appropriate brace as per manufacture guidelines, adjusted velcro fastening components to accurately meet width requirements for patient, and sternal notch for proper fit/comfort (IF TLSO)  Type of Orthotic Provided: [TLSO rigid 4 piece with sternal notch]  Brand Dispensed: [employer]  Model Name and # Dispensed: -3 Back Brace II   Size Dispensed: [XL]  Anterior Support From/To: [Pubis to sternum]  Posterior Support From/To: [T1-S5]  Orthotic Materials: Velcro, padding, strapping  Reason for Orthotic: Improve ADL function, optimal positioning, pain management, joint/muscle support  Date to Initiate Orthotic Use: [9/1/2023]  Wearing Schedule: [When upright greater than 30 degrees]  Orthotic Site Condition: [Intact]  Tolerance: [Good]  Duration: [Per physician orders]      Assessment/Plan:   Pt presents with a diagnosis of low back pain following a fall and has L1 compression fracture that are limiting her ability to sit and stand.  The patient was fitted for a TLSO brace.  She was educated in appropriate donning and doffing of her brace.  She was instructed to wear her brace when she is upright greater than 30 degrees.    Goals:   LTG 1: The patient will be independent in donning/doffing TLSO brace in order to independently perform at home.  STATUS: Met    Interventions:   Orthotic Management: Patient educated and appropriate donning and doffing of TLSO brace       Outcome Measures       Row Name 09/01/23 0936          Optimal Instrument    Optimal Instrument Optimal - 3  -LR     Moving - lying to sitting 3  -LR     Standing 3  -LR     Walking - short distance 3  -LR     From  the list, choose the 3 activities you would most like to be able to do without any difficulty Moving -lying to sitting;Standing;Walking -short distance  -LR     Total Score Optimal - 3 6  -LR       Row Name 09/01/23 0936          Functional Assessment    Outcome Measure Options Optimal Instrument  -LR               User Key  (r) = Recorded By, (t) = Taken By, (c) = Cosigned By      Initials Name Provider Type    LR Domi Barton, PT Physical Therapist                     Time Calculation:   PT Evaluation Complexity  History, PT Evaluation Complexity: 3 or more personal factors and/or comorbidities  Examination of Body Systems (PT Eval Complexity): 1-2 elements  Clinical Presentation (PT Evaluation Complexity): stable  Clinical Decision Making (PT Evaluation Complexity): low complexity  Overall Complexity (PT Evaluation Complexity): low complexity     PT Charges       Row Name 09/01/23 0937             Time Calculation    PT Received On 09/01/23  -LR         Timed Charges    71690 - PT Initial Orthotic Encounter 10  -LR         Untimed Charges    PT Eval/Re-eval Minutes 28  -LR      L-Codes 173627 TLSO 4 RIGID PC FONSECA SCAP+FIT  -LR         Total Minutes    Timed Charges Total Minutes 10  -LR      Untimed Charges Total Minutes 28  -LR       Total Minutes 38  -LR                User Key  (r) = Recorded By, (t) = Taken By, (c) = Cosigned By      Initials Name Provider Type    LR Domi Barton, PT Physical Therapist                  Therapy Charges for Today       Code Description Service Date Service Provider Modifiers Qty    31755639208 HC ORTHOTIC(S) MGMT/TRAIN INITIAL ENCOUNTER, EACH 15MIN 9/1/2023 Domi Barton, PT GP 1    04785474795 HC PT EVAL LOW COMPLEXITY 2 9/1/2023 Domi Barton, PT GP 1    46336952346 HC BRACE BACK TLSO RIGID  9/1/2023 Domi Barton, PT  1            PT G-Codes  Outcome Measure Options: Optimal Instrument       Domi Barton, PT  9/1/2023

## 2023-09-26 ENCOUNTER — HOSPITAL ENCOUNTER (OUTPATIENT)
Dept: ULTRASOUND IMAGING | Facility: HOSPITAL | Age: 78
Discharge: HOME | End: 2023-09-26
Admitting: INTERNAL MEDICINE
Payer: MEDICARE

## 2023-09-26 DIAGNOSIS — E04.1 THYROID NODULE: ICD-10-CM

## 2023-09-26 PROCEDURE — 76536 US EXAM OF HEAD AND NECK: CPT

## 2024-02-01 ENCOUNTER — HOSPITAL ENCOUNTER (OUTPATIENT)
Dept: ULTRASOUND IMAGING | Facility: HOSPITAL | Age: 79
Discharge: HOME | End: 2024-02-01
Admitting: OTOLARYNGOLOGY
Payer: MEDICARE

## 2024-02-01 DIAGNOSIS — K11.8 PAROTID MASS: ICD-10-CM

## 2024-02-01 PROCEDURE — 76536 US EXAM OF HEAD AND NECK: CPT

## 2024-03-27 ENCOUNTER — HOSPITAL ENCOUNTER (EMERGENCY)
Facility: HOSPITAL | Age: 79
Discharge: HOME | End: 2024-03-27
Attending: EMERGENCY MEDICINE | Admitting: EMERGENCY MEDICINE
Payer: MEDICARE

## 2024-03-27 VITALS
BODY MASS INDEX: 31.81 KG/M2 | DIASTOLIC BLOOD PRESSURE: 73 MMHG | WEIGHT: 172.84 LBS | SYSTOLIC BLOOD PRESSURE: 138 MMHG | RESPIRATION RATE: 18 BRPM | OXYGEN SATURATION: 96 % | HEART RATE: 59 BPM | HEIGHT: 62 IN | TEMPERATURE: 98.1 F

## 2024-03-27 DIAGNOSIS — B02.30 HERPES ZOSTER OPHTHALMICUS OF RIGHT EYE: Primary | ICD-10-CM

## 2024-03-27 DIAGNOSIS — T50.B95A: ICD-10-CM

## 2024-03-27 PROCEDURE — 99283 EMERGENCY DEPT VISIT LOW MDM: CPT

## 2024-03-27 RX ORDER — OXYCODONE AND ACETAMINOPHEN 7.5; 325 MG/1; MG/1
1 TABLET ORAL ONCE
Status: COMPLETED | OUTPATIENT
Start: 2024-03-27 | End: 2024-03-27

## 2024-03-27 RX ORDER — PREDNISOLONE ACETATE 10 MG/ML
1 SUSPENSION/ DROPS OPHTHALMIC 4 TIMES DAILY
Qty: 5 ML | Refills: 0 | Status: SHIPPED | OUTPATIENT
Start: 2024-03-27 | End: 2024-04-21

## 2024-03-27 RX ORDER — LORAZEPAM 0.5 MG/1
0.5 TABLET ORAL ONCE
Status: COMPLETED | OUTPATIENT
Start: 2024-03-27 | End: 2024-03-27

## 2024-03-27 RX ORDER — VALACYCLOVIR HYDROCHLORIDE 500 MG/1
1000 TABLET, FILM COATED ORAL ONCE
Status: COMPLETED | OUTPATIENT
Start: 2024-03-27 | End: 2024-03-27

## 2024-03-27 RX ORDER — PROPARACAINE HYDROCHLORIDE 5 MG/ML
2 SOLUTION/ DROPS OPHTHALMIC ONCE
Status: COMPLETED | OUTPATIENT
Start: 2024-03-27 | End: 2024-03-27

## 2024-03-27 RX ORDER — VALACYCLOVIR HYDROCHLORIDE 1 G/1
1000 TABLET, FILM COATED ORAL 3 TIMES DAILY
Qty: 21 TABLET | Refills: 0 | Status: SHIPPED | OUTPATIENT
Start: 2024-03-27

## 2024-03-27 RX ADMIN — OXYCODONE HYDROCHLORIDE AND ACETAMINOPHEN 1 TABLET: 7.5; 325 TABLET ORAL at 10:35

## 2024-03-27 RX ADMIN — VALACYCLOVIR 1000 MG: 500 TABLET ORAL at 10:34

## 2024-03-27 RX ADMIN — LORAZEPAM 0.5 MG: 0.5 TABLET ORAL at 12:07

## 2024-03-27 RX ADMIN — PROPARACAINE HYDROCHLORIDE 2 DROP: 5 SOLUTION/ DROPS OPHTHALMIC at 10:34

## 2024-03-27 NOTE — DISCHARGE INSTRUCTIONS
It does look like you have a case of shingles (herpes zoster) that spread to your right eye causing pain and irritation.    Prescription pain meds as needed this week for pain.    You need to take the antiviral (Valtrex) 3 times a day to treat the shingles this week.    You can take Tylenol or ibuprofen for basic pain .    Use the steroid anti-inflammatory eyedrops 3 or 4 times a day to help relieve eye inflammation and please try calling around to local eye clinics to get seen by an eye doctor for an exam in the next day or 2.

## 2024-03-27 NOTE — ED PROVIDER NOTES
Time: 11:25 AM EDT  Date of encounter:  3/27/2024  Independent Historian/Clinical History and Information was obtained by:   Patient and Family    History is limited by: N/A    Chief Complaint: Right eye pain and new rash on forehead and nose and eye this week      History of Present Illness:  Patient is a 78 y.o. year old female who was in her normal state of health last week when she got the Shingrix herpes zoster vaccine who presents to the emergency department for evaluation of acute onset of pain along the right side of the forehead and right eye and right eye irritation despite no recent trauma or foreign body starting the day after her shingles vaccine.    She now has a painful red rash on the right side of her forehead going into the right eyebrow and towards the right eye and right upper and lower eyelids are inflamed and edematous and she has conjunctival injection and photophobia in the right eye now.    She went to urgent care but was referred here.    She is not currently on any antivirals for what appears to be shingles rash.  She is not on any immunosuppressant meds or chemotherapy.    HPI    Patient Care Team  Primary Care Provider: Alice Lino APRN    Past Medical History:     Allergies   Allergen Reactions    Statins Myalgia    Sulfa Antibiotics Headache     Severe headache       Past Medical History:   Diagnosis Date    Acid reflux     Arthritis     Asthma     Cancer     Diabetes     GERD (gastroesophageal reflux disease)     Hyperlipemia     Hyperlipidemia     Parotid mass     Seasonal allergies     Sleep apnea     Thyroid disorder     Thyroid nodule     nontoxic multinodular goiter    Type 2 diabetes mellitus      Past Surgical History:   Procedure Laterality Date    BREAST LUMPECTOMY Left 2009    US GUIDED FINE NEEDLE ASPIRATION  6/8/2020    US GUIDED FINE NEEDLE ASPIRATION  6/8/2020     Family History   Problem Relation Age of Onset    Stroke Mother     Heart disease Mother     Diabetes  Mother     Osteoporosis Mother     Cancer Father     Diabetes Father     Pancreatic cancer Father     Cancer Sister     Diabetes Sister     Lung cancer Sister     Osteoporosis Brother     Arthritis Brother     Heart disease Maternal Grandmother     Heart disease Paternal Grandmother     Heart disease Daughter        Home Medications:  Prior to Admission medications    Medication Sig Start Date End Date Taking? Authorizing Provider   Accu-Chek Dalia Plus test strip  11/29/21   Girish Ricketts MD   amLODIPine (NORVASC) 10 MG tablet  10/7/22   Girish Ricketts MD   amLODIPine (NORVASC) 5 MG tablet  11/19/21   Girish Ricketts MD   Baclofen 5 MG tablet  9/22/22   Girish Ricketts MD   busPIRone (BUSPAR) 10 MG tablet Take 10 mg by mouth. 11/2/22   Girish Ricketts MD   Cholecalciferol 50 MCG (2000 UT) tablet Vitamin D3 50 mcg (2,000 unit) oral tablet take 1 tablet by oral route 2 times a day   Active    Girish Ricketts MD   DULoxetine (CYMBALTA) 60 MG capsule Take 60 mg by mouth Daily. 12/28/21 12/28/22  Girish Ricketts MD   ibuprofen (ADVIL,MOTRIN) 600 MG tablet Take 600 mg by mouth.    Girish Ricketts MD   indapamide (LOZOL) 2.5 MG tablet  5/21/21   Girish Ricketts MD   insulin aspart (novoLOG) 100 UNIT/ML injection INJECT 100 UNITS DAILY VIA PUMP 5/2/21   Girish Ricketts MD   Insulin Pen Needle (Droplet Pen Needles) 32G X 6 MM misc USE FOR INJECTION DAILY. 4/22/21   Girish Ricketts MD   insulin regular (NovoLIN R) 100 UNIT/ML injection Novolin R Regular U-100 Insulin 100 unit/mL injection solution    Girish Ricketts MD   losartan (COZAAR) 100 MG tablet Take 100 mg by mouth Daily. 11/2/22   Girish Ricketts MD   oxyCODONE-acetaminophen (PERCOCET) 5-325 MG per tablet Take 1 tablet by mouth Every 6 (Six) Hours As Needed for Severe Pain. 9/1/23   Fredy Blanton, DO   Ozempic, 0.25 or 0.5 MG/DOSE, 2 MG/1.5ML solution pen-injector  10/24/22   Liliam  "MD Girish   prednisoLONE acetate (PRED FORTE) 1 % ophthalmic suspension Administer 1 drop to the right eye 4 (Four) Times a Day for 3 days. 3/27/24 4/21/24  Ariel Saunders MD   rosuvastatin (CRESTOR) 40 MG tablet  11/24/21   Provider, MD Girish   valACYclovir (VALTREX) 1000 MG tablet Take 1 tablet by mouth 3 (Three) Times a Day. 3/27/24   Ariel Saunders MD        Social History:   Social History     Tobacco Use    Smoking status: Never    Smokeless tobacco: Never   Substance Use Topics    Alcohol use: Yes     Comment: rarely drinks, has been drinking for 31 or more years    Drug use: Never         Review of Systems:  Review of Systems   I performed a 10 point review of systems which was all negative, except for the positives found in the HPI above.  Physical Exam:  /79 (BP Location: Right arm, Patient Position: Lying)   Pulse 69   Temp 98.5 °F (36.9 °C) (Oral)   Resp 18   Ht 157.5 cm (62\")   Wt 78.4 kg (172 lb 13.5 oz)   SpO2 97%   BMI 31.61 kg/m²     Physical Exam   General: Awake alert and in moderate distress, holding right eye in pain    HEENT: Head normocephalic atraumatic, eyes PERRLA EOMI but she has significant right eye conjunctival injection and mild chemosis but no foreign body seen and also erythema and edema of both upper and lower eyelids concerning for inflammatory reaction but there is no purulent drainage from the eye or signs of trauma or foreign body, nose normal, oropharynx normal.    Neck: Supple full range of motion, no meningismus, no lymphadenopathy    Heart: Regular rate and rhythm, no murmurs or rubs, 2+ radial pulses bilaterally    Lungs: Clear to auscultation bilaterally without wheezes or crackles, no respiratory distress    Abdomen: Soft, nontender, nondistended, no rebound or guarding    Skin: Warm, dry, there is a erythematous rash scattered over the right forehead extending to the right nose and right upper eyelid concerning for shingles/herpes zoster " involving the right eye    Musculoskeletal: Normal range of motion, no lower extremity edema    Neurologic: Oriented x3, no motor deficits no sensory deficits    Psychiatric: Mood appears stable, no psychosis          Procedures:  Procedures      Medical Decision Making:      Comorbidities that affect care:    Diabetes    External Notes reviewed:    None      The following orders were placed and all results were independently analyzed by me:  Orders Placed This Encounter   Procedures    Please call both of these numbers regarding probable shingles vaccine (Shingrix) adverse effect of causing active herpes zoster outbreak including herpes zoster ophthalmicus starting the following day after the vaccine. [facility] phone number is [phone]...    Ophthalmology (on-call MD unless specified)       Medications Given in the Emergency Department:  Medications   valACYclovir (VALTREX) tablet 1,000 mg (1,000 mg Oral Given 3/27/24 1034)   proparacaine (ALCAINE) 0.5 % ophthalmic solution 2 drop (2 drops Right Eye Given 3/27/24 1034)   oxyCODONE-acetaminophen (PERCOCET) 7.5-325 MG per tablet 1 tablet (1 tablet Oral Given 3/27/24 1035)        ED Course:         Labs:    Lab Results (last 24 hours)       ** No results found for the last 24 hours. **             Imaging:    No Radiology Exams Resulted Within Past 24 Hours      Differential Diagnosis and Discussion:    Eye Pain/Blurred Vision: Differential diagnosis includes but is not limited to dacryocystitis, hordeolum, chalazion, periorbital cellulitis, cavernous sinus thrombosis, blepharitis, and glaucoma.  Rash: Differential diagnosis includes but is not limited to sepsis, cellulitis, [place] Spotted Fever, meningitis, meningococcemia, Varicella, Strep infection, dermatitis, allergic reaction, Lyme disease, and toxic shock syndrome.        MDM           This patient is a pleasant 78-year-old female who presents with new painful rash on the right side of the forehead extending  to the right eyebrow and nose and appears to be involving the right eye.    On right eye exam she has some photophobia present and conjunctival injection and tearing but no purulent drainage and it looks like she probably has a case of herpes zoster ophthalmicus.  No foreign body seen.    I gave her some proparacaine drops for comfort and a pain pill and also started her on Valtrex for what appears to be acute herpes zoster.    As she just started getting symptoms the day following her Shingrix zoster vaccination I will also reach out to [facility] and the vaccine adverse event reporting hotline to make them aware.    We do not have any ophthalmologist on-call here, but I reached out to multiple local eye clinics and ophthalmologist and it looks like they are giving her an urgent follow-up appointment tomorrow at [facility].        She is followed by pain management clinic and already on chronic Percocet, but I will also prescribe prednisolone acetate eyedrops for corneal inflammation and Valtrex and have her follow-up with the eye clinic              Patient Care Considerations:          Consultants/Shared Management Plan:        Social Determinants of Health:    Patient has presented with family members who are responsible, reliable and will ensure follow up care.      Disposition and Care Coordination:    Discharged: I considered escalation of care by admitting this patient to the hospital, however we do not have any ophthalmologist on-call so I am instead arranging urgent ophthalmologic follow-up in the next day or 2.    I have explained the patient´s condition, diagnoses and treatment plan based on the information available to me at this time. I have answered questions and addressed any concerns. The patient has a good  understanding of the patient´s diagnosis, condition, and treatment plan as can be expected at this point. The vital signs have been stable. The patient´s condition is stable and  appropriate for discharge from the emergency department.      The patient will pursue further outpatient evaluation with the primary care physician or other designated or consulting physician as outlined in the discharge instructions. They are agreeable to this plan of care and follow-up instructions have been explained in detail. The patient has received these instructions in written format and has expressed an understanding of the discharge instructions. The patient is aware that any significant change in condition or worsening of symptoms should prompt an immediate return to this or the closest emergency department or call to 1.  I have explained discharge medications and the need for follow up with the patient/caretakers. This was also printed in the discharge instructions. Patient was discharged with the following medications and follow up:      Medication List        New Prescriptions      prednisoLONE acetate 1 % ophthalmic suspension  Commonly known as: PRED FORTE  Administer 1 drop to the right eye 4 (Four) Times a Day for 3 days.     valACYclovir 1000 MG tablet  Commonly known as: VALTREX  Take 1 tablet by mouth 3 (Three) Times a Day.               Where to Get Your Medications        These medications were sent to [facility] [place] [address] [address]      Hours: Monday to Friday 9 AM to 7:30 PM, Saturday 9 AM to 2 PM Phone: [phone]   prednisoLONE acetate 1 % ophthalmic suspension  valACYclovir 1000 MG tablet      Alice Lino, APRN  [address]  [address]  [place] [address]  [phone]    Call in 1 day  As needed, for a follow-up appointment    Jose Manuel Starr MD  [address] [place] [address] [address] [place] [address]  [phone]    Call today  for a follow-up appointment for your eye exam    Akin Fowler MD  [address] [place] [address] [address] [place] [address]  [phone]    Call today  for a follow-up appointment for eye exam    [facility]  [facility] [place]  [address]  [facility] [address]  [phone]  Call today  for a follow-up appointment for eye exam       Final diagnoses:   Herpes zoster ophthalmicus of right eye   Adverse effect of zoster vaccine        ED Disposition       ED Disposition   Discharge    Condition   Stable    Comment   --               This medical record created using voice recognition software.             Ariel Saunders MD  03/27/24 1753